# Patient Record
Sex: FEMALE | Race: WHITE | NOT HISPANIC OR LATINO | Employment: FULL TIME | URBAN - METROPOLITAN AREA
[De-identification: names, ages, dates, MRNs, and addresses within clinical notes are randomized per-mention and may not be internally consistent; named-entity substitution may affect disease eponyms.]

---

## 2023-05-19 LAB
ALANINE AMINOTRANSFERASE (SGPT) (U/L) IN SER/PLAS: 20 U/L (ref 7–45)
ALBUMIN (G/DL) IN SER/PLAS: 4 G/DL (ref 3.4–5)
ALKALINE PHOSPHATASE (U/L) IN SER/PLAS: 76 U/L (ref 33–136)
ANION GAP IN SER/PLAS: 12 MMOL/L (ref 10–20)
ASPARTATE AMINOTRANSFERASE (SGOT) (U/L) IN SER/PLAS: 17 U/L (ref 9–39)
BASOPHILS (10*3/UL) IN BLOOD BY AUTOMATED COUNT: 0.08 X10E9/L (ref 0–0.1)
BASOPHILS/100 LEUKOCYTES IN BLOOD BY AUTOMATED COUNT: 2 % (ref 0–2)
BILIRUBIN TOTAL (MG/DL) IN SER/PLAS: 1.1 MG/DL (ref 0–1.2)
CALCIDIOL (25 OH VITAMIN D3) (NG/ML) IN SER/PLAS: 43 NG/ML
CALCIUM (MG/DL) IN SER/PLAS: 9 MG/DL (ref 8.6–10.3)
CARBON DIOXIDE, TOTAL (MMOL/L) IN SER/PLAS: 25 MMOL/L (ref 21–32)
CHLORIDE (MMOL/L) IN SER/PLAS: 108 MMOL/L (ref 98–107)
COBALAMIN (VITAMIN B12) (PG/ML) IN SER/PLAS: 420 PG/ML (ref 211–911)
CREATININE (MG/DL) IN SER/PLAS: 0.68 MG/DL (ref 0.5–1.05)
EOSINOPHILS (10*3/UL) IN BLOOD BY AUTOMATED COUNT: 0.1 X10E9/L (ref 0–0.7)
EOSINOPHILS/100 LEUKOCYTES IN BLOOD BY AUTOMATED COUNT: 2.6 % (ref 0–6)
ERYTHROCYTE DISTRIBUTION WIDTH (RATIO) BY AUTOMATED COUNT: 13 % (ref 11.5–14.5)
ERYTHROCYTE MEAN CORPUSCULAR HEMOGLOBIN CONCENTRATION (G/DL) BY AUTOMATED: 33.3 G/DL (ref 32–36)
ERYTHROCYTE MEAN CORPUSCULAR VOLUME (FL) BY AUTOMATED COUNT: 87 FL (ref 80–100)
ERYTHROCYTES (10*6/UL) IN BLOOD BY AUTOMATED COUNT: 4.85 X10E12/L (ref 4–5.2)
FERRITIN (UG/LL) IN SER/PLAS: 181 UG/L (ref 8–150)
FOLATE (NG/ML) IN SER/PLAS: 15.9 NG/ML
GFR FEMALE: >90 ML/MIN/1.73M2
GLUCOSE (MG/DL) IN SER/PLAS: 151 MG/DL (ref 74–99)
HEMATOCRIT (%) IN BLOOD BY AUTOMATED COUNT: 42 % (ref 36–46)
HEMOGLOBIN (G/DL) IN BLOOD: 14 G/DL (ref 12–16)
IMMATURE GRANULOCYTES/100 LEUKOCYTES IN BLOOD BY AUTOMATED COUNT: 0 % (ref 0–0.9)
IRON (UG/DL) IN SER/PLAS: 93 UG/DL (ref 35–150)
IRON BINDING CAPACITY (UG/DL) IN SER/PLAS: 283 UG/DL (ref 240–445)
IRON SATURATION (%) IN SER/PLAS: 33 % (ref 25–45)
LEUKOCYTES (10*3/UL) IN BLOOD BY AUTOMATED COUNT: 3.9 X10E9/L (ref 4.4–11.3)
LYMPHOCYTES (10*3/UL) IN BLOOD BY AUTOMATED COUNT: 1.31 X10E9/L (ref 1.2–4.8)
LYMPHOCYTES/100 LEUKOCYTES IN BLOOD BY AUTOMATED COUNT: 33.4 % (ref 13–44)
MONOCYTES (10*3/UL) IN BLOOD BY AUTOMATED COUNT: 0.3 X10E9/L (ref 0.1–1)
MONOCYTES/100 LEUKOCYTES IN BLOOD BY AUTOMATED COUNT: 7.7 % (ref 2–10)
NEUTROPHILS (10*3/UL) IN BLOOD BY AUTOMATED COUNT: 2.13 X10E9/L (ref 1.2–7.7)
NEUTROPHILS/100 LEUKOCYTES IN BLOOD BY AUTOMATED COUNT: 54.3 % (ref 40–80)
NRBC (PER 100 WBCS) BY AUTOMATED COUNT: 0 /100 WBC (ref 0–0)
PARATHYRIN INTACT (PG/ML) IN SER/PLAS: 59.4 PG/ML (ref 18.5–88)
PLATELETS (10*3/UL) IN BLOOD AUTOMATED COUNT: 272 X10E9/L (ref 150–450)
POTASSIUM (MMOL/L) IN SER/PLAS: 3.5 MMOL/L (ref 3.5–5.3)
PROTEIN TOTAL: 6.3 G/DL (ref 6.4–8.2)
SODIUM (MMOL/L) IN SER/PLAS: 141 MMOL/L (ref 136–145)
UREA NITROGEN (MG/DL) IN SER/PLAS: 7 MG/DL (ref 6–23)

## 2023-05-24 LAB — VITAMIN B1, WHOLE BLOOD: 126 NMOL/L (ref 70–180)

## 2023-07-19 LAB
ALANINE AMINOTRANSFERASE (SGPT) (U/L) IN SER/PLAS: 12 U/L (ref 7–45)
ALBUMIN (G/DL) IN SER/PLAS: 4 G/DL (ref 3.4–5)
ALKALINE PHOSPHATASE (U/L) IN SER/PLAS: 74 U/L (ref 33–136)
ANION GAP IN SER/PLAS: 11 MMOL/L (ref 10–20)
ASPARTATE AMINOTRANSFERASE (SGOT) (U/L) IN SER/PLAS: 11 U/L (ref 9–39)
BASOPHILS (10*3/UL) IN BLOOD BY AUTOMATED COUNT: 0.06 X10E9/L (ref 0–0.1)
BASOPHILS/100 LEUKOCYTES IN BLOOD BY AUTOMATED COUNT: 1.6 % (ref 0–2)
BILIRUBIN TOTAL (MG/DL) IN SER/PLAS: 0.8 MG/DL (ref 0–1.2)
CALCIUM (MG/DL) IN SER/PLAS: 9 MG/DL (ref 8.6–10.3)
CARBON DIOXIDE, TOTAL (MMOL/L) IN SER/PLAS: 28 MMOL/L (ref 21–32)
CHLORIDE (MMOL/L) IN SER/PLAS: 107 MMOL/L (ref 98–107)
COBALAMIN (VITAMIN B12) (PG/ML) IN SER/PLAS: 189 PG/ML (ref 211–911)
CREATININE (MG/DL) IN SER/PLAS: 0.69 MG/DL (ref 0.5–1.05)
EOSINOPHILS (10*3/UL) IN BLOOD BY AUTOMATED COUNT: 0.11 X10E9/L (ref 0–0.7)
EOSINOPHILS/100 LEUKOCYTES IN BLOOD BY AUTOMATED COUNT: 3 % (ref 0–6)
ERYTHROCYTE DISTRIBUTION WIDTH (RATIO) BY AUTOMATED COUNT: 12.8 % (ref 11.5–14.5)
ERYTHROCYTE MEAN CORPUSCULAR HEMOGLOBIN CONCENTRATION (G/DL) BY AUTOMATED: 33.3 G/DL (ref 32–36)
ERYTHROCYTE MEAN CORPUSCULAR VOLUME (FL) BY AUTOMATED COUNT: 86 FL (ref 80–100)
ERYTHROCYTES (10*6/UL) IN BLOOD BY AUTOMATED COUNT: 4.91 X10E12/L (ref 4–5.2)
FERRITIN (UG/LL) IN SER/PLAS: 127 UG/L (ref 8–150)
GFR FEMALE: >90 ML/MIN/1.73M2
GLUCOSE (MG/DL) IN SER/PLAS: 133 MG/DL (ref 74–99)
HEMATOCRIT (%) IN BLOOD BY AUTOMATED COUNT: 42 % (ref 36–46)
HEMOGLOBIN (G/DL) IN BLOOD: 14 G/DL (ref 12–16)
IMMATURE GRANULOCYTES/100 LEUKOCYTES IN BLOOD BY AUTOMATED COUNT: 0.3 % (ref 0–0.9)
LEUKOCYTES (10*3/UL) IN BLOOD BY AUTOMATED COUNT: 3.7 X10E9/L (ref 4.4–11.3)
LYMPHOCYTES (10*3/UL) IN BLOOD BY AUTOMATED COUNT: 1.22 X10E9/L (ref 1.2–4.8)
LYMPHOCYTES/100 LEUKOCYTES IN BLOOD BY AUTOMATED COUNT: 32.8 % (ref 13–44)
MAGNESIUM (MG/DL) IN SER/PLAS: 1.88 MG/DL (ref 1.6–2.4)
MONOCYTES (10*3/UL) IN BLOOD BY AUTOMATED COUNT: 0.3 X10E9/L (ref 0.1–1)
MONOCYTES/100 LEUKOCYTES IN BLOOD BY AUTOMATED COUNT: 8.1 % (ref 2–10)
NEUTROPHILS (10*3/UL) IN BLOOD BY AUTOMATED COUNT: 2.02 X10E9/L (ref 1.2–7.7)
NEUTROPHILS/100 LEUKOCYTES IN BLOOD BY AUTOMATED COUNT: 54.2 % (ref 40–80)
NRBC (PER 100 WBCS) BY AUTOMATED COUNT: 0 /100 WBC (ref 0–0)
PLATELETS (10*3/UL) IN BLOOD AUTOMATED COUNT: 245 X10E9/L (ref 150–450)
POTASSIUM (MMOL/L) IN SER/PLAS: 3.6 MMOL/L (ref 3.5–5.3)
PROTEIN TOTAL: 6.3 G/DL (ref 6.4–8.2)
SODIUM (MMOL/L) IN SER/PLAS: 142 MMOL/L (ref 136–145)
UREA NITROGEN (MG/DL) IN SER/PLAS: 9 MG/DL (ref 6–23)

## 2023-07-24 LAB
VITAMIN A (RETINOL): 0.47 MG/L (ref 0.3–1.2)
VITAMIN A (RETINYL PALMITATE): <0.02 MG/L (ref 0–0.1)
VITAMIN A, INTERPRETATION: NORMAL

## 2023-08-30 ENCOUNTER — HOSPITAL ENCOUNTER (OUTPATIENT)
Dept: DATA CONVERSION | Facility: HOSPITAL | Age: 58
End: 2023-08-30
Attending: SURGERY | Admitting: SURGERY
Payer: COMMERCIAL

## 2023-08-30 DIAGNOSIS — R11.2 NAUSEA WITH VOMITING, UNSPECIFIED: ICD-10-CM

## 2023-08-30 DIAGNOSIS — Z98.84 BARIATRIC SURGERY STATUS: ICD-10-CM

## 2023-09-29 VITALS — HEIGHT: 68 IN | BODY MASS INDEX: 29.07 KG/M2 | WEIGHT: 191.8 LBS

## 2023-09-30 NOTE — H&P
History of Present Illness:   History Present Illness:  Reason for surgery: Dysphagia   HPI:    58F, BMI 29. Not tolerating solids. UGI: mild esophageal dysmotility, contrast passes.   EGD today with possible dilation    PMH: GERD, DM, KELLEE  PSH: RYGB (2/23) - MA, cataract, tubal ligation  FH: Mom: CHF, HTN, stroke, DM, HLD. Dad: MI.  SH: Non smoker, no alcohol, no drugs  Meds: Zofran, vit., dapagliflozin, citalopram, carvedilol, losartan, omeprazole 40, statin,  All: statin, Lisinopril, morphine    Review of Systems:  Constitutional: No subjective fever, No lethargy  Eyes: No vision change  Head/Neck: No head pain, no neck pain  Respiratory/Thorax: No difficulty breathing, no chest pain  Cardiovascular: No chest pain, no palpitations  Gastrointestinal: No nausea, +vomiting, no abdominal pain, no diarrhea  Genitourinary: No difficulty voiding  Musculoskeletal: No muscle pain, no joint pain  Neurological: No sensory deficit, no paresthesia   Psychological: No mood change  Skin: No rash, No itching        Allergies:        Allergies:  ·  lisinopril : Cough    Home Medication Review:   Home Medications Reviewed: yes     Impression/Procedure:   ·  Impression and Planned Procedure: 58F, BMI 29. Not tolerating solids. UGI: mild esophageal dysmotility, contrast passes.   EGD today with possible dilation       ERAS (Enhanced Recovery After Surgery):  ·  ERAS Patient: no                   Physical Exam by System:    Constitutional: Well developed, awake/alert/oriented  x3, no distress, alert and cooperative   Eyes: PERRL, EOMI, clear sclera   ENMT: mucous membranes moist, no apparent injury,  no lesions seen   Head/Neck: Neck supple, no apparent injury, thyroid  without mass or tenderness, No JVD   Respiratory/Thorax: Patent airways, CTAB, normal  breath sounds with good chest expansion, thorax symmetric   Cardiovascular: Regular, rate and rhythm, no murmurs,  warm extremities   Gastrointestinal: Nondistended, soft,  non-tender,  no rebound tenderness or guarding, no masses palpable, no organomegaly   Extremities: normal extremities, no cyanosis edema,  contusions or wounds, no clubbing   Neurological: alert and oriented x3, intact senses,  motor, response and reflexes, normal strength   Skin: Warm and dry, no lesions, no rashes     Consent:   COVID-19 Consent:  ·  COVID-19 Risk Consent Surgeon has reviewed key risks related to the risk of dario COVID-19 and if they contract COVID-19 what the risks are.       Electronic Signatures:  Yuliana Lau)  (Signed 30-Aug-2023 07:28)   Authored: [email or web address]   Co-Signer: History of Present Illness, Allergies, Home Medication Review, Impression/Procedure, ERAS, Physical Exam, Consent, Note Completion  Donita Phillip)  (Signed 29-Aug-2023 10:46)   Authored: History of Present Illness, Allergies, Home  Medication Review, Impression/Procedure, ERAS, Physical Exam, Consent, Note Completion      Last Updated: 30-Aug-2023 07:28 by Yuliana Lau)

## 2023-11-06 ENCOUNTER — NUTRITION (OUTPATIENT)
Dept: SURGERY | Facility: HOSPITAL | Age: 58
End: 2023-11-06
Payer: COMMERCIAL

## 2023-11-06 VITALS — WEIGHT: 175 LBS | BODY MASS INDEX: 26.68 KG/M2

## 2023-11-06 DIAGNOSIS — Z98.84 S/P GASTRIC BYPASS: Primary | ICD-10-CM

## 2023-11-06 NOTE — PROGRESS NOTES
Initial Surgery Date:  2.1.23  Surgeon:  uJdi  Procedure:  Gastric Bypass      ASSESSMENT:  Current weight:  175   lbs       Ht:   68       in.           BMI: 26.6  Previous weight:  186 lbs  Initial start weight:   269 lbs  EBW:   105 lbs  Total weight change: -94 lbs  %EBW Lost: 89.5%    PROGRESS:    Nutrition Interventions for last encounter (8.4.23)  1.  Try to drink 4-6 ounces of your smoothies every 2 hours. You can alternate smoothies with other high protein liquids like high protein pudding, yogurt, low fat cream soups with protein powder. Try to consume a total of 60-80 grams/day.  2.       Continue to drink 64 oz. of zero calorie beverages per day  3.       Continue no drinking 30 min before, during the meal and for 30 minutes after the meal  4.       Continue to follow vit/min regimen   5.       Stick with light activity like walking for now until you can consume more food and protein.  6.  Attend our Virtual Support Group monthly!    CHANGES IN TREATMENT:   Patient met goals:    Partially   Actions to meet previous goals:   Eat 3 meals and 2 snacks daily       24 hour food recall:  Soft/Regular, 60g protein, 64oz fluid   Breakfast:  1 oz crumbled sausage, 1 egg, peppers, 1 tbsp shredded cheese (10g)  Snack:   Lunch:  1c white chicken chili w/ cheese   Snack:     Dinner:  1c white chicken chili with cheese, x3 crackers   Snack:   Beverages:  64oz water  Alcohol:  none       Vitamins: x2 gummy MVI, 500 mg Calcium x2, B12 monthly IM  Medications: see list  Nausea/Vomiting/Diarrhea/Constipation: once/week   Physical Activity: walking twice/week at the [facility]     READINESS TO LEARN:  Motivation to learn:           Interested      Understanding of instruction: Good        Anticipated Compliance: Good    Family Support: Unable to assess-family not present   Patient presents with post-op weight loss surgery gastric bypass.  Pt is 9 months postop.  Recently had esophageal dilation 8/30/23 d/t dysphagia.   Self  reported weight today. Patient has lost 94 pounds since initial assessment accounting for 89.5% loss excess body weight.    Tolerating a Regular diet without difficulty.  Seems to do better with soft foods. Protein intake is not adequate for post-op individual. Reminded to eat 3 meals, and 1-2 high protein snacks to meet her goal. Encouraged to do liquid protein (yogurt, protein drink, cottage cheese, protein pudding) at mid-day snack. Fluid consumption is  adequate. Patient is not supplementing recommended vitamin/minerals. States she was told be her PCP to stop the Celebrate MVI and start taking gummies. May be when she had stricture. States she will be getting labs done this week and will follow up. Recommended to take calcium 3 tablets twice/day to get in 1200mg/day. Pt states no concerns/difficulties at this time.  Encouraged monthly SG meetings.        Malnutrition Screening  Significant unintentional weight loss? n/a  Eating less than 75% of usual intake for more than 2 weeks? n/a      Nutrition Diagnosis:   1. Increased protein and nutrition needs related to altered GI function as evidenced by pt. s/p gastric bypass   sleeve gastrectomy.   2. Food- and nutrition-related knowledge deficit related to lack of prior exposure to surgical weight loss information as evidenced by diet recall.       Nutrition Interventions:   1. Modify type and amount of food and nutrients within meals and snacks.  2. Comprehensive Nutrition Education  -Nutrition education materials: SG schedule, today's recap      Recommendations:    1. Eat 3 meals and 2 snacks/day to meet your protein goal. Aim for 3-4 ounces/meal, 1-2 ounce/snack. Your goal is 60-80g protein per day.  2. Continue to drink 64 oz. of zero calorie beverages per day  3. Continue no drinking 30 min before, during the meal and for 30 minutes after the meal  4. Complete your labs ordered by Dr. Barrett to see if we need to adjust your vitamins/minerals.   5. Continue to  exercise 2-3 days/week, working towards a goal of 30-45 minutes 5 days/week.  6.         Attend our Virtual Support Group Monthly!    Nutrition Monitoring and Evaluation:   Weight loss1-2 pounds per week  Criteria: weight check, food recall  Need for Follow-up: 1 year postop, JUSTYN WYNN  Bariatric Surgery Dietitian  Phone: [phone]

## 2023-11-26 ENCOUNTER — OFFICE VISIT (OUTPATIENT)
Dept: URGENT CARE | Facility: CLINIC | Age: 58
End: 2023-11-26
Payer: COMMERCIAL

## 2023-11-26 VITALS
BODY MASS INDEX: 26.52 KG/M2 | TEMPERATURE: 97.5 F | HEART RATE: 87 BPM | OXYGEN SATURATION: 94 % | SYSTOLIC BLOOD PRESSURE: 130 MMHG | WEIGHT: 175 LBS | HEIGHT: 68 IN | RESPIRATION RATE: 16 BRPM | DIASTOLIC BLOOD PRESSURE: 71 MMHG

## 2023-11-26 DIAGNOSIS — J01.00 ACUTE NON-RECURRENT MAXILLARY SINUSITIS: Primary | ICD-10-CM

## 2023-11-26 PROCEDURE — 3008F BODY MASS INDEX DOCD: CPT | Performed by: PHYSICIAN ASSISTANT

## 2023-11-26 PROCEDURE — 1036F TOBACCO NON-USER: CPT | Performed by: PHYSICIAN ASSISTANT

## 2023-11-26 PROCEDURE — 99204 OFFICE O/P NEW MOD 45 MIN: CPT | Performed by: PHYSICIAN ASSISTANT

## 2023-11-26 PROCEDURE — 87635 SARS-COV-2 COVID-19 AMP PRB: CPT

## 2023-11-26 RX ORDER — AMOXICILLIN AND CLAVULANATE POTASSIUM 875; 125 MG/1; MG/1
1 TABLET, FILM COATED ORAL 2 TIMES DAILY
Qty: 14 TABLET | Refills: 0 | Status: SHIPPED | OUTPATIENT
Start: 2023-11-26 | End: 2023-12-03

## 2023-11-26 ASSESSMENT — ENCOUNTER SYMPTOMS
PSYCHIATRIC NEGATIVE: 1
EYE DISCHARGE: 0
SORE THROAT: 0
WOUND: 0
COLOR CHANGE: 0
ALLERGIC/IMMUNOLOGIC NEGATIVE: 1
FATIGUE: 1
MYALGIAS: 1
ABDOMINAL PAIN: 0
COUGH: 1
BACK PAIN: 1
SHORTNESS OF BREATH: 0
HEMATOLOGIC/LYMPHATIC NEGATIVE: 1
RHINORRHEA: 0
ARTHRALGIAS: 0
NEUROLOGICAL NEGATIVE: 1
ACTIVITY CHANGE: 0
APPETITE CHANGE: 0
EYE PAIN: 0
FEVER: 0
SINUS PRESSURE: 1
VOMITING: 0
DIARRHEA: 0
NAUSEA: 0
SINUS PAIN: 1
BLOOD IN STOOL: 0
EYE REDNESS: 0
WHEEZING: 0
ENDOCRINE NEGATIVE: 1

## 2023-11-26 ASSESSMENT — PAIN SCALES - GENERAL: PAINLEVEL: 2

## 2023-11-26 NOTE — PATIENT INSTRUCTIONS
Assessment/Plan   Problem List Items Addressed This Visit       Acute non-recurrent maxillary sinusitis - Primary    Relevant Medications    amoxicillin-pot clavulanate (Augmentin) 875-125 mg tablet

## 2023-11-26 NOTE — PROGRESS NOTES
"Subjective   Patient ID: Ana Beltran is a 58 y.o. female who presents for URI (Cough, congestion, weakness x2 weeks/Neck & lower back pain today 11/25/23).  This patient notes that she has been having 2 weeks of upper respiratory symptoms also developed sinus pressure and pain over the maxillary sinuses over the last 5 days.  She notes that she has been trying over-the-counter medications.  She also notes that over the last 5 days she has developed some myalgias and feels like she is significantly fatigued.  She denies any chest pain or shortness of breath.  Denies any production to the cough that she has been having.  Denies any nausea vomiting diarrhea or abdominal pain      Review of Systems   Constitutional:  Positive for fatigue. Negative for activity change, appetite change and fever.   HENT:  Positive for congestion, sinus pressure and sinus pain. Negative for rhinorrhea and sore throat.    Eyes:  Negative for pain, discharge and redness.   Respiratory:  Positive for cough. Negative for shortness of breath and wheezing.    Cardiovascular:  Negative for chest pain and leg swelling.   Gastrointestinal:  Negative for abdominal pain, blood in stool, diarrhea, nausea and vomiting.   Endocrine: Negative.    Musculoskeletal:  Positive for back pain and myalgias. Negative for arthralgias and gait problem.   Skin:  Negative for color change, rash and wound.   Allergic/Immunologic: Negative.    Neurological: Negative.    Hematological: Negative.    Psychiatric/Behavioral: Negative.         Objective   /71   Pulse 87   Temp 36.4 °C (97.5 °F)   Resp 16   Ht 1.727 m (5' 8\")   Wt 79.4 kg (175 lb)   SpO2 94%   BMI 26.61 kg/m²   Physical Exam  Vitals reviewed.   Constitutional:       General: She is not in acute distress.     Appearance: Normal appearance. She is not toxic-appearing.   HENT:      Head: Normocephalic.      Comments: Exquisite tenderness to the maxillary sinuses bilaterally with palpation     " Right Ear: Tympanic membrane and ear canal normal. No tenderness. No mastoid tenderness.      Left Ear: Tympanic membrane and ear canal normal. No tenderness. No mastoid tenderness.      Nose: Congestion and rhinorrhea present.      Mouth/Throat:      Mouth: Mucous membranes are moist.      Pharynx: Oropharynx is clear. Posterior oropharyngeal erythema present.      Comments: There is no signs of peritonsillar abscess or retropharyngeal abscess  Eyes:      Conjunctiva/sclera: Conjunctivae normal.      Pupils: Pupils are equal, round, and reactive to light.   Neck:      Comments: Patient does have full range of motion of the neck though she does note that turning to the left increases pain along the left sided sternocleidomastoid and the left-sided trapezius.  Cardiovascular:      Rate and Rhythm: Normal rate and regular rhythm.      Heart sounds: No murmur heard.  Pulmonary:      Effort: No respiratory distress.      Breath sounds: No stridor. No wheezing, rhonchi or rales.   Chest:      Chest wall: No tenderness.   Abdominal:      Tenderness: There is no abdominal tenderness. There is no guarding.   Musculoskeletal:         General: Normal range of motion.      Cervical back: Normal range of motion. No rigidity or tenderness.      Comments: No CTL spinal tenderness on exam.  Some bilateral lower lumbar paraspinal muscular tenderness on exam   Lymphadenopathy:      Cervical: No cervical adenopathy.   Skin:     General: Skin is warm and dry.      Capillary Refill: Capillary refill takes less than 2 seconds.      Findings: No erythema.   Neurological:      General: No focal deficit present.      Mental Status: She is alert.         Assessment/Plan   Problem List Items Addressed This Visit       Acute non-recurrent maxillary sinusitis - Primary    Relevant Medications    amoxicillin-pot clavulanate (Augmentin) 875-125 mg tablet   -Patient with notable tenderness over the maxillary sinuses her symptoms have been ongoing  for 2 weeks now.  She also is endorsing some generalized fatigue, weakness, she also has some myalgia and muscular pain.  This has developed over the course of the last few days and I have sent a COVID PCR test to the lab to rule this out as a secondary issue.  Given the longevity of the patient's symptoms and physical exam findings I still opted to treat as acute bacterial rhinosinusitis

## 2023-11-27 LAB — SARS-COV-2 RNA RESP QL NAA+PROBE: NOT DETECTED

## 2024-01-29 ENCOUNTER — LAB (OUTPATIENT)
Dept: LAB | Facility: LAB | Age: 59
End: 2024-01-29
Payer: COMMERCIAL

## 2024-01-29 DIAGNOSIS — E78.5 HYPERLIPIDEMIA, UNSPECIFIED HYPERLIPIDEMIA TYPE: ICD-10-CM

## 2024-01-29 DIAGNOSIS — J01.90 ACUTE SINUSITIS, RECURRENCE NOT SPECIFIED, UNSPECIFIED LOCATION: ICD-10-CM

## 2024-01-29 DIAGNOSIS — E11.9 TYPE 2 DIABETES MELLITUS WITHOUT COMPLICATION, UNSPECIFIED WHETHER LONG TERM INSULIN USE (MULTI): ICD-10-CM

## 2024-01-29 LAB
ALBUMIN SERPL BCP-MCNC: 4.1 G/DL (ref 3.4–5)
ALP SERPL-CCNC: 96 U/L (ref 33–136)
ALT SERPL W P-5'-P-CCNC: 22 U/L (ref 7–45)
ANION GAP SERPL CALC-SCNC: 10 MMOL/L (ref 10–20)
AST SERPL W P-5'-P-CCNC: 14 U/L (ref 9–39)
BASOPHILS # BLD AUTO: 0.06 X10*3/UL (ref 0–0.1)
BASOPHILS NFR BLD AUTO: 1.4 %
BILIRUB SERPL-MCNC: 0.8 MG/DL (ref 0–1.2)
BUN SERPL-MCNC: 11 MG/DL (ref 6–23)
CALCIUM SERPL-MCNC: 9.3 MG/DL (ref 8.6–10.3)
CHLORIDE SERPL-SCNC: 105 MMOL/L (ref 98–107)
CHOLEST SERPL-MCNC: 175 MG/DL (ref 0–199)
CHOLESTEROL/HDL RATIO: 3.2
CO2 SERPL-SCNC: 30 MMOL/L (ref 21–32)
CREAT SERPL-MCNC: 0.65 MG/DL (ref 0.5–1.05)
EGFRCR SERPLBLD CKD-EPI 2021: >90 ML/MIN/1.73M*2
EOSINOPHIL # BLD AUTO: 0.08 X10*3/UL (ref 0–0.7)
EOSINOPHIL NFR BLD AUTO: 1.9 %
ERYTHROCYTE [DISTWIDTH] IN BLOOD BY AUTOMATED COUNT: 13 % (ref 11.5–14.5)
EST. AVERAGE GLUCOSE BLD GHB EST-MCNC: 166 MG/DL
GLUCOSE SERPL-MCNC: 98 MG/DL (ref 74–99)
HBA1C MFR BLD: 7.4 %
HCT VFR BLD AUTO: 40.6 % (ref 36–46)
HDLC SERPL-MCNC: 54 MG/DL
HGB BLD-MCNC: 13.3 G/DL (ref 12–16)
IMM GRANULOCYTES # BLD AUTO: 0.01 X10*3/UL (ref 0–0.7)
IMM GRANULOCYTES NFR BLD AUTO: 0.2 % (ref 0–0.9)
LDLC SERPL CALC-MCNC: 98 MG/DL
LYMPHOCYTES # BLD AUTO: 1.54 X10*3/UL (ref 1.2–4.8)
LYMPHOCYTES NFR BLD AUTO: 36.1 %
MCH RBC QN AUTO: 28.4 PG (ref 26–34)
MCHC RBC AUTO-ENTMCNC: 32.8 G/DL (ref 32–36)
MCV RBC AUTO: 87 FL (ref 80–100)
MONOCYTES # BLD AUTO: 0.3 X10*3/UL (ref 0.1–1)
MONOCYTES NFR BLD AUTO: 7 %
NEUTROPHILS # BLD AUTO: 2.28 X10*3/UL (ref 1.2–7.7)
NEUTROPHILS NFR BLD AUTO: 53.4 %
NON HDL CHOLESTEROL: 121 MG/DL (ref 0–149)
NRBC BLD-RTO: 0 /100 WBCS (ref 0–0)
PLATELET # BLD AUTO: 259 X10*3/UL (ref 150–450)
POTASSIUM SERPL-SCNC: 4 MMOL/L (ref 3.5–5.3)
PROT SERPL-MCNC: 6.3 G/DL (ref 6.4–8.2)
RBC # BLD AUTO: 4.69 X10*6/UL (ref 4–5.2)
SODIUM SERPL-SCNC: 141 MMOL/L (ref 136–145)
TRIGL SERPL-MCNC: 117 MG/DL (ref 0–149)
TSH SERPL-ACNC: 0.93 MIU/L (ref 0.44–3.98)
VLDL: 23 MG/DL (ref 0–40)
WBC # BLD AUTO: 4.3 X10*3/UL (ref 4.4–11.3)

## 2024-01-29 PROCEDURE — 84443 ASSAY THYROID STIM HORMONE: CPT

## 2024-01-29 PROCEDURE — 36415 COLL VENOUS BLD VENIPUNCTURE: CPT

## 2024-01-29 PROCEDURE — 80053 COMPREHEN METABOLIC PANEL: CPT

## 2024-01-29 PROCEDURE — 83036 HEMOGLOBIN GLYCOSYLATED A1C: CPT

## 2024-01-29 PROCEDURE — 85025 COMPLETE CBC W/AUTO DIFF WBC: CPT

## 2024-01-29 PROCEDURE — 80061 LIPID PANEL: CPT

## 2024-01-31 PROCEDURE — RXMED WILLOW AMBULATORY MEDICATION CHARGE

## 2024-02-01 PROCEDURE — RXMED WILLOW AMBULATORY MEDICATION CHARGE

## 2024-02-02 ENCOUNTER — TELEPHONE (OUTPATIENT)
Dept: PHARMACY | Facility: HOSPITAL | Age: 59
End: 2024-02-02
Payer: COMMERCIAL

## 2024-02-02 DIAGNOSIS — E11.9 CONTROLLED TYPE 2 DIABETES MELLITUS WITHOUT COMPLICATION, WITHOUT LONG-TERM CURRENT USE OF INSULIN (MULTI): Primary | ICD-10-CM

## 2024-02-02 NOTE — TELEPHONE ENCOUNTER
[facility] ([facility])    Ana Beltran is a 58 y.o. female was contacted by Clinical Pharmacy Team to complete a comprehensive medication review (CMR) with a pharmacist as part of the [employer] diabetes program.      No Known Allergies    [id number] - [place], [place] - [address]   [address] [place] [address]  Phone: [phone] Fax: [phone]    She has lost over 100 bs from bariatric surgery since last VBID visit. Since then has not had to take Tresiba or Novolog. Is on Farxiga 10 mg daily and metformin 1000 mg/day. She mentions she was losing too much weight. A1c is down to 7.4% from last check earlier in the week.     LAB  Lab Results   Component Value Date    BILITOT 0.8 01/29/2024    CALCIUM 9.3 01/29/2024    CO2 30 01/29/2024     01/29/2024    CREATININE 0.65 01/29/2024    GLUCOSE 98 01/29/2024    ALKPHOS 96 01/29/2024    K 4.0 01/29/2024    PROT 6.3 (L) 01/29/2024     01/29/2024    AST 14 01/29/2024    ALT 22 01/29/2024    BUN 11 01/29/2024    ANIONGAP 10 01/29/2024    MG 1.88 07/19/2023    ALBUMIN 4.1 01/29/2024    GFRF >90 07/19/2023     Lab Results   Component Value Date    TRIG 117 01/29/2024    CHOL 175 01/29/2024    LDLCALC 98 01/29/2024    HDL 54.0 01/29/2024     Lab Results   Component Value Date    HGBA1C 7.4 (H) 01/29/2024       Current Outpatient Medications on File Prior to Visit   Medication Sig Dispense Refill    blood sugar diagnostic (OneTouch Verio test strips) strip Check blood sugar twice daily 200 strip 1    blood sugar diagnostic strip Use to check blood sugar twice daily 200 each 1    blood sugar diagnostic strip use to test blood sugar 30 each 1    blood-glucose meter misc use to test blood sugar 1 each 0    carvedilol (Coreg) 3.125 mg tablet Take 1 tablet by mouth two times daily 180 tablet 0    cyanocobalamin, vitamin B-12, (Vitamin B-12) 2,500 mcg tablet, sublingual SL tablet take 1  tablet by mouth once daily 30 tablet 3    dapagliflozin propanediol (Farxiga) 10 mg TAKE 1 TABLET BY MOUTH ONCE DAILY 30 tablet 2    lancets 33 gauge misc Check blood sugar twice daily 200 each 1    losartan (Cozaar) 25 mg tablet TAKE 1 TABLET BY MOUTH ONCE DAILY 60 tablet 0    metFORMIN (Glucophage) 500 mg tablet Take 1 tablet by mouth twice daily 60 tablet 2    rosuvastatin (Crestor) 40 mg tablet Take 1 tablet (40 mg) by mouth once daily. 90 tablet 0     No current facility-administered medications on file prior to visit.        CURRENT DIABETES PHARMACOTHERAPY  - metformin 500 mg BID  - Farxiga 10 mg daily     SECONDARY PREVENTION  - Statin? yes  - ACE-I/ARB? yes    ASSESSMENT/PLAN:  - Patient enrolled in  Employee diabetes program for $0 co-pays on diabetes medications/supplies. Enrollment should be active in 2-4 weeks   - Requested VBID enrollment date: 2/2/24  - PharmD Management Level: 1  -  Pharmacy fill location: [place]    Problem List Items Addressed This Visit       Controlled type 2 diabetes mellitus without complication, without long-term current use of insulin (CMS/[facility]) - Primary    1. Continue taking metformin and farxiga as prescribed   2. Pharmacy follow up in 1 year or as needed      Continue all meds under the continuation of care with the referring provider and clinical pharmacy team.    Jg Anne, PharmD     Patient/Caregiver was informed they may decline to participate or withdraw from participation in pharmacy services at any time.

## 2024-02-06 ENCOUNTER — PHARMACY VISIT (OUTPATIENT)
Dept: PHARMACY | Facility: CLINIC | Age: 59
End: 2024-02-06
Payer: COMMERCIAL

## 2024-02-06 PROCEDURE — RXMED WILLOW AMBULATORY MEDICATION CHARGE

## 2024-02-29 PROCEDURE — RXMED WILLOW AMBULATORY MEDICATION CHARGE

## 2024-03-05 ENCOUNTER — PHARMACY VISIT (OUTPATIENT)
Dept: PHARMACY | Facility: CLINIC | Age: 59
End: 2024-03-05
Payer: COMMERCIAL

## 2024-03-05 PROCEDURE — RXMED WILLOW AMBULATORY MEDICATION CHARGE

## 2024-04-01 PROCEDURE — RXMED WILLOW AMBULATORY MEDICATION CHARGE

## 2024-04-03 ENCOUNTER — PHARMACY VISIT (OUTPATIENT)
Dept: PHARMACY | Facility: CLINIC | Age: 59
End: 2024-04-03
Payer: COMMERCIAL

## 2024-04-03 PROCEDURE — RXMED WILLOW AMBULATORY MEDICATION CHARGE

## 2024-04-04 ENCOUNTER — PHARMACY VISIT (OUTPATIENT)
Dept: PHARMACY | Facility: CLINIC | Age: 59
End: 2024-04-04
Payer: COMMERCIAL

## 2024-04-04 PROCEDURE — RXMED WILLOW AMBULATORY MEDICATION CHARGE

## 2024-04-06 ENCOUNTER — APPOINTMENT (OUTPATIENT)
Dept: RADIOLOGY | Facility: CLINIC | Age: 59
End: 2024-04-06
Payer: COMMERCIAL

## 2024-04-06 ENCOUNTER — HOSPITAL ENCOUNTER (OUTPATIENT)
Dept: RADIOLOGY | Facility: CLINIC | Age: 59
Discharge: HOME | End: 2024-04-06
Payer: COMMERCIAL

## 2024-04-06 DIAGNOSIS — Z12.31 ENCOUNTER FOR SCREENING MAMMOGRAM FOR MALIGNANT NEOPLASM OF BREAST: ICD-10-CM

## 2024-04-06 PROCEDURE — 77067 SCR MAMMO BI INCL CAD: CPT | Performed by: RADIOLOGY

## 2024-04-06 PROCEDURE — 77067 SCR MAMMO BI INCL CAD: CPT

## 2024-04-06 PROCEDURE — 77063 BREAST TOMOSYNTHESIS BI: CPT | Performed by: RADIOLOGY

## 2024-04-09 ENCOUNTER — PHARMACY VISIT (OUTPATIENT)
Dept: PHARMACY | Facility: CLINIC | Age: 59
End: 2024-04-09
Payer: COMMERCIAL

## 2024-04-15 PROCEDURE — RXMED WILLOW AMBULATORY MEDICATION CHARGE

## 2024-04-18 ENCOUNTER — PHARMACY VISIT (OUTPATIENT)
Dept: PHARMACY | Facility: CLINIC | Age: 59
End: 2024-04-18
Payer: COMMERCIAL

## 2024-05-01 ENCOUNTER — LAB (OUTPATIENT)
Dept: LAB | Facility: LAB | Age: 59
End: 2024-05-01
Payer: COMMERCIAL

## 2024-05-01 DIAGNOSIS — E11.9 CONTROLLED TYPE 2 DIABETES MELLITUS WITHOUT COMPLICATION, WITHOUT LONG-TERM CURRENT USE OF INSULIN (MULTI): ICD-10-CM

## 2024-05-01 DIAGNOSIS — Z98.84 S/P GASTRIC BYPASS: ICD-10-CM

## 2024-05-01 DIAGNOSIS — E11.65 TYPE 2 DIABETES MELLITUS WITH HYPERGLYCEMIA (MULTI): Primary | ICD-10-CM

## 2024-05-01 DIAGNOSIS — I10 HYPERTENSION, UNSPECIFIED TYPE: ICD-10-CM

## 2024-05-01 LAB
ALBUMIN SERPL BCP-MCNC: 4.2 G/DL (ref 3.4–5)
ALP SERPL-CCNC: 73 U/L (ref 33–136)
ALT SERPL W P-5'-P-CCNC: 20 U/L (ref 7–45)
ANION GAP SERPL CALC-SCNC: 10 MMOL/L (ref 10–20)
AST SERPL W P-5'-P-CCNC: 15 U/L (ref 9–39)
BASOPHILS # BLD AUTO: 0.05 X10*3/UL (ref 0–0.1)
BASOPHILS NFR BLD AUTO: 1.1 %
BILIRUB SERPL-MCNC: 0.5 MG/DL (ref 0–1.2)
BUN SERPL-MCNC: 14 MG/DL (ref 6–23)
CALCIUM SERPL-MCNC: 8.8 MG/DL (ref 8.6–10.3)
CHLORIDE SERPL-SCNC: 107 MMOL/L (ref 98–107)
CHOLEST SERPL-MCNC: 106 MG/DL (ref 0–199)
CHOLESTEROL/HDL RATIO: 2.2
CO2 SERPL-SCNC: 28 MMOL/L (ref 21–32)
CREAT SERPL-MCNC: 0.28 MG/DL (ref 0.5–1.05)
EGFRCR SERPLBLD CKD-EPI 2021: >90 ML/MIN/1.73M*2
EOSINOPHIL # BLD AUTO: 0.1 X10*3/UL (ref 0–0.7)
EOSINOPHIL NFR BLD AUTO: 2.3 %
ERYTHROCYTE [DISTWIDTH] IN BLOOD BY AUTOMATED COUNT: 12.4 % (ref 11.5–14.5)
GLUCOSE SERPL-MCNC: 139 MG/DL (ref 74–99)
HCT VFR BLD AUTO: 36.9 % (ref 36–46)
HDLC SERPL-MCNC: 47.6 MG/DL
HGB BLD-MCNC: 12.6 G/DL (ref 12–16)
IMM GRANULOCYTES # BLD AUTO: 0 X10*3/UL (ref 0–0.7)
IMM GRANULOCYTES NFR BLD AUTO: 0 % (ref 0–0.9)
LDLC SERPL CALC-MCNC: 43 MG/DL
LYMPHOCYTES # BLD AUTO: 1.64 X10*3/UL (ref 1.2–4.8)
LYMPHOCYTES NFR BLD AUTO: 37 %
MCH RBC QN AUTO: 29.2 PG (ref 26–34)
MCHC RBC AUTO-ENTMCNC: 34.1 G/DL (ref 32–36)
MCV RBC AUTO: 86 FL (ref 80–100)
MONOCYTES # BLD AUTO: 0.26 X10*3/UL (ref 0.1–1)
MONOCYTES NFR BLD AUTO: 5.9 %
NEUTROPHILS # BLD AUTO: 2.38 X10*3/UL (ref 1.2–7.7)
NEUTROPHILS NFR BLD AUTO: 53.7 %
NON HDL CHOLESTEROL: 58 MG/DL (ref 0–149)
NRBC BLD-RTO: 0 /100 WBCS (ref 0–0)
PLATELET # BLD AUTO: 243 X10*3/UL (ref 150–450)
POTASSIUM SERPL-SCNC: 4.1 MMOL/L (ref 3.5–5.3)
PROT SERPL-MCNC: 6.2 G/DL (ref 6.4–8.2)
RBC # BLD AUTO: 4.31 X10*6/UL (ref 4–5.2)
SODIUM SERPL-SCNC: 141 MMOL/L (ref 136–145)
TRIGL SERPL-MCNC: 79 MG/DL (ref 0–149)
TSH SERPL-ACNC: 0.93 MIU/L (ref 0.44–3.98)
VIT B12 SERPL-MCNC: 1167 PG/ML (ref 211–911)
VLDL: 16 MG/DL (ref 0–40)
WBC # BLD AUTO: 4.4 X10*3/UL (ref 4.4–11.3)

## 2024-05-01 PROCEDURE — 84443 ASSAY THYROID STIM HORMONE: CPT

## 2024-05-01 PROCEDURE — 85025 COMPLETE CBC W/AUTO DIFF WBC: CPT

## 2024-05-01 PROCEDURE — 82607 VITAMIN B-12: CPT

## 2024-05-01 PROCEDURE — 36415 COLL VENOUS BLD VENIPUNCTURE: CPT

## 2024-05-01 PROCEDURE — 80061 LIPID PANEL: CPT

## 2024-05-01 PROCEDURE — 84207 ASSAY OF VITAMIN B-6: CPT

## 2024-05-01 PROCEDURE — 83036 HEMOGLOBIN GLYCOSYLATED A1C: CPT

## 2024-05-01 PROCEDURE — 80053 COMPREHEN METABOLIC PANEL: CPT

## 2024-05-02 LAB
EST. AVERAGE GLUCOSE BLD GHB EST-MCNC: 140 MG/DL
HBA1C MFR BLD: 6.5 %

## 2024-05-02 PROCEDURE — RXMED WILLOW AMBULATORY MEDICATION CHARGE

## 2024-05-05 LAB — PYRIDOXAL PHOS SERPL-SCNC: 17.5 NMOL/L (ref 20–125)

## 2024-05-06 PROCEDURE — RXMED WILLOW AMBULATORY MEDICATION CHARGE

## 2024-05-08 ENCOUNTER — PHARMACY VISIT (OUTPATIENT)
Dept: PHARMACY | Facility: CLINIC | Age: 59
End: 2024-05-08
Payer: COMMERCIAL

## 2024-06-06 PROCEDURE — RXMED WILLOW AMBULATORY MEDICATION CHARGE

## 2024-06-17 ENCOUNTER — PHARMACY VISIT (OUTPATIENT)
Dept: PHARMACY | Facility: CLINIC | Age: 59
End: 2024-06-17
Payer: COMMERCIAL

## 2024-07-29 PROCEDURE — RXMED WILLOW AMBULATORY MEDICATION CHARGE

## 2024-08-01 ENCOUNTER — PHARMACY VISIT (OUTPATIENT)
Dept: PHARMACY | Facility: CLINIC | Age: 59
End: 2024-08-01
Payer: COMMERCIAL

## 2024-09-09 PROCEDURE — RXMED WILLOW AMBULATORY MEDICATION CHARGE

## 2024-09-10 ENCOUNTER — LAB (OUTPATIENT)
Dept: LAB | Facility: LAB | Age: 59
End: 2024-09-10
Payer: COMMERCIAL

## 2024-09-10 DIAGNOSIS — Z98.84 S/P GASTRIC BYPASS: ICD-10-CM

## 2024-09-10 DIAGNOSIS — E55.9 VITAMIN D INSUFFICIENCY: ICD-10-CM

## 2024-09-10 DIAGNOSIS — I10 HYPERTENSION, UNSPECIFIED TYPE: ICD-10-CM

## 2024-09-10 DIAGNOSIS — E78.5 HYPERLIPIDEMIA, UNSPECIFIED HYPERLIPIDEMIA TYPE: ICD-10-CM

## 2024-09-10 DIAGNOSIS — E53.9 VITAMIN B DEFICIENCY: ICD-10-CM

## 2024-09-10 DIAGNOSIS — E11.9 TYPE 2 DIABETES MELLITUS WITHOUT COMPLICATION, WITHOUT LONG-TERM CURRENT USE OF INSULIN (MULTI): ICD-10-CM

## 2024-09-10 LAB
25(OH)D3 SERPL-MCNC: 28 NG/ML (ref 30–100)
ALBUMIN SERPL BCP-MCNC: 4.1 G/DL (ref 3.4–5)
ALP SERPL-CCNC: 90 U/L (ref 33–136)
ALT SERPL W P-5'-P-CCNC: 15 U/L (ref 7–45)
ANION GAP SERPL CALC-SCNC: 10 MMOL/L (ref 10–20)
AST SERPL W P-5'-P-CCNC: 14 U/L (ref 9–39)
BASOPHILS # BLD AUTO: 0.05 X10*3/UL (ref 0–0.1)
BASOPHILS NFR BLD AUTO: 1.1 %
BILIRUB SERPL-MCNC: 0.4 MG/DL (ref 0–1.2)
BUN SERPL-MCNC: 10 MG/DL (ref 6–23)
CALCIUM SERPL-MCNC: 9 MG/DL (ref 8.6–10.3)
CHLORIDE SERPL-SCNC: 105 MMOL/L (ref 98–107)
CHOLEST SERPL-MCNC: 173 MG/DL (ref 0–199)
CHOLESTEROL/HDL RATIO: 3.3
CO2 SERPL-SCNC: 29 MMOL/L (ref 21–32)
CREAT SERPL-MCNC: 0.66 MG/DL (ref 0.5–1.05)
EGFRCR SERPLBLD CKD-EPI 2021: >90 ML/MIN/1.73M*2
EOSINOPHIL # BLD AUTO: 0.08 X10*3/UL (ref 0–0.7)
EOSINOPHIL NFR BLD AUTO: 1.8 %
ERYTHROCYTE [DISTWIDTH] IN BLOOD BY AUTOMATED COUNT: 12.3 % (ref 11.5–14.5)
GLUCOSE SERPL-MCNC: 138 MG/DL (ref 74–99)
HCT VFR BLD AUTO: 39.6 % (ref 36–46)
HDLC SERPL-MCNC: 52.1 MG/DL
HGB BLD-MCNC: 13.7 G/DL (ref 12–16)
IMM GRANULOCYTES # BLD AUTO: 0.01 X10*3/UL (ref 0–0.7)
IMM GRANULOCYTES NFR BLD AUTO: 0.2 % (ref 0–0.9)
LDLC SERPL CALC-MCNC: 96 MG/DL
LYMPHOCYTES # BLD AUTO: 1.48 X10*3/UL (ref 1.2–4.8)
LYMPHOCYTES NFR BLD AUTO: 32.9 %
MAGNESIUM SERPL-MCNC: 1.89 MG/DL (ref 1.6–2.4)
MCH RBC QN AUTO: 30.2 PG (ref 26–34)
MCHC RBC AUTO-ENTMCNC: 34.6 G/DL (ref 32–36)
MCV RBC AUTO: 87 FL (ref 80–100)
MONOCYTES # BLD AUTO: 0.27 X10*3/UL (ref 0.1–1)
MONOCYTES NFR BLD AUTO: 6 %
NEUTROPHILS # BLD AUTO: 2.61 X10*3/UL (ref 1.2–7.7)
NEUTROPHILS NFR BLD AUTO: 58 %
NON HDL CHOLESTEROL: 121 MG/DL (ref 0–149)
NRBC BLD-RTO: 0 /100 WBCS (ref 0–0)
PHOSPHATE SERPL-MCNC: 3.9 MG/DL (ref 2.5–4.9)
PLATELET # BLD AUTO: 262 X10*3/UL (ref 150–450)
POTASSIUM SERPL-SCNC: 4.3 MMOL/L (ref 3.5–5.3)
PROT SERPL-MCNC: 6.2 G/DL (ref 6.4–8.2)
RBC # BLD AUTO: 4.54 X10*6/UL (ref 4–5.2)
SODIUM SERPL-SCNC: 140 MMOL/L (ref 136–145)
TRIGL SERPL-MCNC: 124 MG/DL (ref 0–149)
TSH SERPL-ACNC: 1.02 MIU/L (ref 0.44–3.98)
VIT B12 SERPL-MCNC: 340 PG/ML (ref 211–911)
VLDL: 25 MG/DL (ref 0–40)
WBC # BLD AUTO: 4.5 X10*3/UL (ref 4.4–11.3)

## 2024-09-10 PROCEDURE — 84443 ASSAY THYROID STIM HORMONE: CPT

## 2024-09-10 PROCEDURE — 82306 VITAMIN D 25 HYDROXY: CPT

## 2024-09-10 PROCEDURE — 80061 LIPID PANEL: CPT

## 2024-09-10 PROCEDURE — 83036 HEMOGLOBIN GLYCOSYLATED A1C: CPT

## 2024-09-10 PROCEDURE — 85025 COMPLETE CBC W/AUTO DIFF WBC: CPT

## 2024-09-10 PROCEDURE — 36415 COLL VENOUS BLD VENIPUNCTURE: CPT

## 2024-09-10 PROCEDURE — 83735 ASSAY OF MAGNESIUM: CPT

## 2024-09-10 PROCEDURE — 84100 ASSAY OF PHOSPHORUS: CPT

## 2024-09-10 PROCEDURE — 82607 VITAMIN B-12: CPT

## 2024-09-10 PROCEDURE — 80053 COMPREHEN METABOLIC PANEL: CPT

## 2024-09-11 LAB
EST. AVERAGE GLUCOSE BLD GHB EST-MCNC: 123 MG/DL
HBA1C MFR BLD: 5.9 %

## 2024-09-12 ENCOUNTER — PHARMACY VISIT (OUTPATIENT)
Dept: PHARMACY | Facility: CLINIC | Age: 59
End: 2024-09-12
Payer: COMMERCIAL

## 2024-09-12 PROCEDURE — RXMED WILLOW AMBULATORY MEDICATION CHARGE

## 2024-09-12 RX ORDER — RESPIRATORY SYNCYTIAL VISUS VACCINE RECOMBINANT, ADJUVANTED 120MCG/0.5
KIT INTRAMUSCULAR
Qty: 0.5 ML | Refills: 0 | OUTPATIENT
Start: 2024-09-12

## 2024-09-12 RX ORDER — ZOSTER VACCINE RECOMBINANT, ADJUVANTED 50 MCG/0.5
KIT INTRAMUSCULAR
Qty: 0.5 EACH | Refills: 1 | OUTPATIENT
Start: 2024-09-12

## 2024-09-12 RX ORDER — PITAVASTATIN CALCIUM 2.09 MG/1
TABLET, FILM COATED ORAL
Qty: 90 TABLET | Refills: 0 | OUTPATIENT
Start: 2024-09-12

## 2024-09-12 RX ORDER — ACETAMINOPHEN AND PHENYLEPHRINE HCL 325; 5 MG/1; MG/1
5000 TABLET ORAL DAILY
Qty: 90 TABLET | Refills: 0 | OUTPATIENT
Start: 2024-09-12

## 2024-09-12 RX ORDER — CITALOPRAM 20 MG/1
TABLET, FILM COATED ORAL
Qty: 90 TABLET | Refills: 1 | OUTPATIENT
Start: 2024-09-12

## 2024-09-16 ENCOUNTER — PHARMACY VISIT (OUTPATIENT)
Dept: PHARMACY | Facility: CLINIC | Age: 59
End: 2024-09-16
Payer: COMMERCIAL

## 2024-10-08 PROCEDURE — RXMED WILLOW AMBULATORY MEDICATION CHARGE

## 2024-10-09 ENCOUNTER — PHARMACY VISIT (OUTPATIENT)
Dept: PHARMACY | Facility: CLINIC | Age: 59
End: 2024-10-09
Payer: COMMERCIAL

## 2024-11-11 ENCOUNTER — TELEPHONE (OUTPATIENT)
Dept: SURGERY | Facility: CLINIC | Age: 59
End: 2024-11-11
Payer: COMMERCIAL

## 2024-11-11 DIAGNOSIS — Z98.84 S/P GASTRIC BYPASS: ICD-10-CM

## 2024-11-11 DIAGNOSIS — R11.2 NAUSEA AND VOMITING, UNSPECIFIED VOMITING TYPE: ICD-10-CM

## 2024-11-11 RX ORDER — ONDANSETRON 4 MG/1
4 TABLET, ORALLY DISINTEGRATING ORAL EVERY 8 HOURS PRN
Qty: 60 TABLET | Refills: 0 | Status: SHIPPED | OUTPATIENT
Start: 2024-11-11 | End: 2024-12-11

## 2024-11-12 PROBLEM — G47.33 OBSTRUCTIVE SLEEP APNEA OF ADULT: Status: ACTIVE | Noted: 2024-11-12

## 2024-11-12 PROBLEM — N32.81 OVERACTIVE BLADDER: Status: ACTIVE | Noted: 2024-11-12

## 2024-11-12 PROBLEM — R05.9 COUGH: Status: ACTIVE | Noted: 2024-10-08

## 2024-11-12 PROBLEM — F32.5 DEPRESSION, MAJOR, IN REMISSION (CMS-HCC): Status: ACTIVE | Noted: 2024-11-12

## 2024-11-12 PROBLEM — Z98.84 BARIATRIC SURGERY STATUS: Status: ACTIVE | Noted: 2023-07-19

## 2024-11-12 PROBLEM — R11.2 NAUSEA AND VOMITING: Status: ACTIVE | Noted: 2024-11-12

## 2024-11-12 PROBLEM — I10 HTN (HYPERTENSION): Status: ACTIVE | Noted: 2019-03-01

## 2024-11-12 PROBLEM — B34.9 ACUTE VIRAL SYNDROME: Status: ACTIVE | Noted: 2024-11-12

## 2024-11-12 PROBLEM — K21.9 GASTROESOPHAGEAL REFLUX DISEASE: Status: ACTIVE | Noted: 2023-07-19

## 2024-11-12 PROBLEM — R10.9 ABDOMINAL PAIN: Status: ACTIVE | Noted: 2024-11-12

## 2024-11-12 PROBLEM — E66.9 OBESITY WITH BODY MASS INDEX 30 OR GREATER: Status: ACTIVE | Noted: 2024-11-12

## 2024-11-12 PROBLEM — E78.5 HYPERLIPIDEMIA: Status: ACTIVE | Noted: 2019-03-01

## 2024-11-12 PROBLEM — K91.2 POSTOPERATIVE MALABSORPTION (HHS-HCC): Status: ACTIVE | Noted: 2024-11-12

## 2024-11-12 PROBLEM — J18.9 LEFT LOWER LOBE PNEUMONIA: Status: ACTIVE | Noted: 2024-11-12

## 2024-11-12 PROBLEM — Z98.84 S/P GASTRIC BYPASS: Status: ACTIVE | Noted: 2024-05-02

## 2024-11-12 PROBLEM — Z98.890 POSTOPERATIVE NAUSEA: Status: ACTIVE | Noted: 2023-07-24

## 2024-11-12 PROBLEM — E55.9 VITAMIN D DEFICIENCY: Status: ACTIVE | Noted: 2019-04-30

## 2024-11-12 PROBLEM — R13.10 DYSPHAGIA: Status: ACTIVE | Noted: 2023-07-19

## 2024-11-12 PROBLEM — R11.0 POSTOPERATIVE NAUSEA: Status: ACTIVE | Noted: 2023-07-24

## 2024-11-12 PROBLEM — K91.30 GASTROINTESTINAL ANASTOMOTIC STRICTURE: Status: ACTIVE | Noted: 2024-11-12

## 2024-11-12 PROBLEM — R06.02 SOB (SHORTNESS OF BREATH) ON EXERTION: Status: ACTIVE | Noted: 2024-11-12

## 2024-11-12 PROBLEM — G47.00 INSOMNIA: Status: ACTIVE | Noted: 2024-11-12

## 2024-11-12 PROBLEM — R53.83 FATIGUE: Status: ACTIVE | Noted: 2024-11-12

## 2024-11-12 PROBLEM — L03.211 CELLULITIS OF FACE: Status: ACTIVE | Noted: 2024-11-12

## 2024-11-12 NOTE — PROGRESS NOTES
BARIATRIC SURGERY CLINIC  FOLLOW UP NOTE      Name: Ana Beltran  MRN: [id number]      Index Surgery  Date of Surgery: 2/1/2023   Surgeon: Dr. Yuliana Lau    Surgical Procedure: Laparoscopic mariah en y gastric bypass 30115    Virtual or Telephone Consent    An interactive audio and video telecommunication system which permits real time communications between the patient (at the originating site) and provider (at the distant site) was utilized to provide this telehealth service.   Verbal consent was requested and obtained from Ana Beltran on this date, 11/14/24 for a telehealth visit.       HPI: 59 year old female presenting today with concerns related to nausea and vomiting after ingestion of solid proteins for about the last two weeks.  Patient denies any sick contacts.  Endorses diarrhea that started within the last couple of days. Patient had an upper GI Xray completed yesterday.     Diet Regular (unable to tolerate solid protein presently)     Concerns related to:  Nausea/Vomiting, Reflux: Postprandial vomiting of solid proteins   Abdominal Pain: Denies  Diarrhea/Constipation Diarrhea     DAILY SUPPLEMENTS:  Calcium: Calcium Citrate w/ vitamin D (1200 - 1500mg)  Multivitamin & Minerals: 2 per day  Vitamin B12: 500 mcg/day     PPI:Omeprazole 40mg daily       Upper GI 11/13/24  FINDINGS:  Initial  image demonstrates  an unremarkable bowel gas pattern.      Distal esophagus: Grossly unremarkable with limited visualization at  fluoroscopy      GE junction:  Unremarkable      Gastric pouch: Gastrografin readily passed the GE junction filling  the gastric pouch without evidence of obstruction or extravasation.      Mucosa:  Unremarkable without definite ulceration      Mariah limb: Gastrografin passes without obstruction or extravasation.      Proximal small bowel: Unremarkable without dilatation or mucosal  edema. The jejunal-jejunal anastomosis was not distinctly visualized.      IMPRESSION:  1.  Unremarkable upper GI study status post remote Mariah-en-Y  reconstruction.    Current Outpatient Medications   Medication Sig Dispense Refill    albuterol 90 mcg/actuation inhaler Inhale 2 puffs by mouth every 6 hours as needed for wheezing 18 g 0    benzonatate (Tessalon Perles) 100 mg capsule Take 1 capsule by mouth 3 times daily for 7 days 21 capsule 0    blood sugar diagnostic (OneTouch Verio test strips) strip Check blood sugar twice daily 200 strip 1    blood sugar diagnostic strip Use to check blood sugar twice daily 200 each 1    blood sugar diagnostic strip use to test blood sugar 30 each 1    blood-glucose meter misc use to test blood sugar 1 each 0    carvedilol (Coreg) 3.125 mg tablet Take 1 tablet by mouth two times daily 180 tablet 0    cholecalciferol, vitamin D3, 125 mcg (5,000 unit) tablet,disintegrating Take 5,000 Units by mouth once daily. 90 tablet 0    citalopram (CeleXA) 20 mg tablet take 1 tablet by mouth daily 90 tablet 1    cyanocobalamin (Vitamin B-12) 250 mcg tablet Take 1 tablet by mouth daily 90 tablet 0    cyanocobalamin, vitamin B-12, (Vitamin B-12) 2,500 mcg tablet, sublingual SL tablet take 1 tablet by mouth once daily 30 tablet 3    dapagliflozin propanediol (Farxiga) 10 mg TAKE 1 TABLET BY MOUTH ONCE DAILY 30 tablet 2    dexAMETHasone (Decadron) 6 mg tablet Take 1 tablet ORALLY DAILY for 7 days 7 tablet 0    folic acid/vit B complex and C (B complex-vitamin C-folic acid) 400 mcg tablet extended release Take 1 tablet by mouth once daily. 90 tablet 1    lancets 33 gauge misc Check blood sugar twice daily 200 each 1    losartan (Cozaar) 25 mg tablet TAKE 1 TABLET BY MOUTH ONCE DAILY 60 tablet 0    losartan (Cozaar) 25 mg tablet Take 1 tablet by mouth everyday 90 tablet 1    metFORMIN (Glucophage) 500 mg tablet Take 1 tablet by mouth twice daily 60 tablet 2    ondansetron ODT (Zofran-ODT) 4 mg disintegrating tablet Take 1 tablet (4 mg) by mouth every 8 hours if needed for nausea or  vomiting. 60 tablet 0    pitavastatin calcium 2 mg tablet TAKE 1 TABLET BY MOUTH DAILY 90 tablet 0    respiratory syncytial virus, RSV, vaccine, adjuvanted, age 60y+ (Arexvy, PF,) 120 mcg/0.5 mL suspension for reconstitution INJECT 0.5 mL IM ONCE 0.5 mL 0    rosuvastatin (Crestor) 40 mg tablet Take 1 tablet by mouth daily 90 tablet 1    zoster vaccine-recombinant adjuvanted (Shingrix, PF,) 50 mcg/0.5 mL vaccine INJECT 0.5 mL ONCE,Instr:repeat dose in 2 to 6 months 0.5 each 1     No current facility-administered medications for this visit.       Comorbidities:  Patient Active Problem List   Diagnosis    Acute non-recurrent maxillary sinusitis    Controlled type 2 diabetes mellitus without complication, without long-term current use of insulin (Multi)    Bariatric surgery status    Abdominal pain    Acute viral syndrome    Cellulitis of face    Cough    Depression, major, in remission (CMS-[facility])    Dysphagia    HTN (hypertension)    Fatigue    Gastroesophageal reflux disease    Gastrointestinal anastomotic stricture    Hyperlipidemia    Insomnia    Left lower lobe pneumonia    Postoperative nausea    Obesity with body mass index 30 or greater    Obstructive sleep apnea of adult    Overactive bladder    S/P gastric bypass    SOB (shortness of breath) on exertion    Sprain of ankle    Vitamin D deficiency    Nausea and vomiting    Postoperative malabsorption ([facility])         REVIEW OF SYSTEMS:  CONSTITUTIONAL: Patient denies fevers, chills, sweats and weight changes.  CARDIOVASCULAR: Patient denies chest pains, palpitations, orthopnea and paroxysmal nocturnal dyspnea.  RESPIRATORY: No dyspnea on exertion, no wheezing or cough.  GI: No nausea, vomiting, diarrhea, constipation, abdominal pain, hematochezia or melena.  MUSCULOSKELETAL: No myalgias or arthralgias.  NEUROLOGIC: No chronic headaches, no seizures. Patient denies numbness, tingling or weakness.  PSYCHIATRIC: Patient denies problems with mood disturbance. No  problems with anxiety.  ENDOCRINE: No excessive urination or excessive thirst.  DERMATOLOGIC: Patient denies any rashes or skin changes.    PHYSICAL EXAM:  There were no vitals taken for this visit.  Alert, well appearing, no acute distress, nourished, hydrated.  Anicteric sclera, no ptosis  Facial symmetry  Neck supple  Unlabored respirations.  Easily conversant without increased respiratory effort  Oriented to person, place, time.  Judgement intact.  Appropriate mood, affect.       ASSESSMENT & PLAN:  59 y.o. female presenting for follow up visit s/p xiomara-en-y gastric bypass in 2023 with concerns related to nausea and vomiting after ingestion of solid proteins for about the last two weeks.  Patient denies any sick contacts.  Endorses diarrhea that started within the last couple of days. Patient had an upper GI Xray completed yesterday.     Upper GI was reportedly normal I do not see any significant stricture or any other problems.    Etiology of her symptoms is unclear.  She was recently placed on Decadron for upper respiratory tract issues and was not on PPI during that time.  She could have developed an ulcer versus other issues which need further workup.      Plan:    Will start omeprazole 40 mg daily  Will get a CT scan abdomen pelvis with p.o. and IV contrast  Continue bariatric diet, supplements      Follow up after CT scan is done.

## 2024-11-12 NOTE — PATIENT INSTRUCTIONS
You were seen today for concerns with nausea and vomiting.   You will be due for annual lab work, please have this completed today  Be sure to continue with exercise, goal should be 3-5x a week with an hour each time.   Increase the variety and intensity of your exercise.   Make sure you are taking your vitamins B12, MVI and calcium.  See the dietician for counseling.   Call with any questions!

## 2024-11-13 ENCOUNTER — HOSPITAL ENCOUNTER (OUTPATIENT)
Dept: RADIOLOGY | Facility: HOSPITAL | Age: 59
Discharge: HOME | End: 2024-11-13
Payer: COMMERCIAL

## 2024-11-13 DIAGNOSIS — Z98.84 S/P GASTRIC BYPASS: ICD-10-CM

## 2024-11-13 DIAGNOSIS — R11.2 NAUSEA AND VOMITING, UNSPECIFIED VOMITING TYPE: ICD-10-CM

## 2024-11-13 PROCEDURE — 74240 X-RAY XM UPR GI TRC 1CNTRST: CPT | Performed by: STUDENT IN AN ORGANIZED HEALTH CARE EDUCATION/TRAINING PROGRAM

## 2024-11-13 PROCEDURE — A9698 NON-RAD CONTRAST MATERIALNOC: HCPCS | Performed by: SURGERY

## 2024-11-13 PROCEDURE — 2500000005 HC RX 250 GENERAL PHARMACY W/O HCPCS: Performed by: SURGERY

## 2024-11-13 PROCEDURE — 74240 X-RAY XM UPR GI TRC 1CNTRST: CPT

## 2024-11-14 ENCOUNTER — TELEMEDICINE (OUTPATIENT)
Dept: SURGERY | Facility: CLINIC | Age: 59
End: 2024-11-14
Payer: COMMERCIAL

## 2024-11-14 DIAGNOSIS — R11.2 NAUSEA AND VOMITING, UNSPECIFIED VOMITING TYPE: ICD-10-CM

## 2024-11-14 DIAGNOSIS — Z98.84 S/P GASTRIC BYPASS: ICD-10-CM

## 2024-11-14 DIAGNOSIS — R11.2 NAUSEA AND VOMITING, UNSPECIFIED VOMITING TYPE: Primary | ICD-10-CM

## 2024-11-14 DIAGNOSIS — K91.2 POSTOPERATIVE MALABSORPTION (HHS-HCC): ICD-10-CM

## 2024-11-14 PROCEDURE — 3044F HG A1C LEVEL LT 7.0%: CPT | Performed by: SURGERY

## 2024-11-14 PROCEDURE — 4010F ACE/ARB THERAPY RXD/TAKEN: CPT | Performed by: SURGERY

## 2024-11-14 PROCEDURE — 3048F LDL-C <100 MG/DL: CPT | Performed by: SURGERY

## 2024-11-14 PROCEDURE — 99213 OFFICE O/P EST LOW 20 MIN: CPT | Mod: 95 | Performed by: SURGERY

## 2024-11-14 PROCEDURE — 99213 OFFICE O/P EST LOW 20 MIN: CPT | Performed by: SURGERY

## 2024-11-14 RX ORDER — OMEPRAZOLE 40 MG/1
40 CAPSULE, DELAYED RELEASE ORAL
Qty: 30 CAPSULE | Refills: 3 | Status: SHIPPED | OUTPATIENT
Start: 2024-11-14

## 2024-11-25 NOTE — PROGRESS NOTES
BARIATRIC SURGERY CLINIC  FOLLOW UP NOTE      Name: Ana Beltran  MRN: [id number]      Index Surgery  Date of Surgery: 2/1/2023   Surgeon: Dr. Yuliana Lau    Surgical Procedure: Laparoscopic mariah en y gastric bypass [id number]    Virtual or Telephone Consent    An interactive audio and video telecommunication system which permits real time communications between the patient (at the originating site) and provider (at the distant site) was utilized to provide this telehealth service.   Verbal consent was requested and obtained from Ana Beltran on this date, 12/06/24 for a telehealth visit.       HPI: 59 year old female with history of Mariah-en-Y gastric bypass in February 2023 presents today as a virtual follow up to review results of CT scan. C/o nausea and vomiting after ingestion of solid proteins.     CT Abd & Pel ---Pending 11/29/24---    Diet Regular (unable to tolerate solid protein presently)     Concerns related to:  Nausea/Vomiting, Reflux: Postprandial vomiting of solid proteins   Abdominal Pain: Denies  Diarrhea/Constipation Diarrhea     DAILY SUPPLEMENTS:  Calcium: Calcium Citrate w/ vitamin D (1200 - 1500mg)  Multivitamin & Minerals: 2 per day  Vitamin B12: 500 mcg/day     PPI:Omeprazole 40mg daily       Upper GI 11/13/24  FINDINGS:  Initial  image demonstrates  an unremarkable bowel gas pattern.      Distal esophagus: Grossly unremarkable with limited visualization at  fluoroscopy      GE junction:  Unremarkable      Gastric pouch: Gastrografin readily passed the GE junction filling  the gastric pouch without evidence of obstruction or extravasation.      Mucosa:  Unremarkable without definite ulceration      Mariah limb: Gastrografin passes without obstruction or extravasation.      Proximal small bowel: Unremarkable without dilatation or mucosal  edema. The jejunal-jejunal anastomosis was not distinctly visualized.      IMPRESSION:  1. Unremarkable upper GI study status post remote  Mariah-en-Y  reconstruction.    Current Outpatient Medications   Medication Sig Dispense Refill    blood sugar diagnostic (OneTouch Verio test strips) strip Check blood sugar twice daily 200 strip 1    blood sugar diagnostic strip Use to check blood sugar twice daily 200 each 1    blood sugar diagnostic strip use to test blood sugar 30 each 1    blood-glucose meter misc use to test blood sugar 1 each 0    carvedilol (Coreg) 3.125 mg tablet Take 1 tablet by mouth two times daily 180 tablet 0    cholecalciferol, vitamin D3, 125 mcg (5,000 unit) tablet,disintegrating Take 5,000 Units by mouth once daily. 90 tablet 0    citalopram (CeleXA) 20 mg tablet take 1 tablet by mouth daily 90 tablet 1    cyanocobalamin (Vitamin B-12) 250 mcg tablet Take 1 tablet by mouth daily 90 tablet 0    cyanocobalamin, vitamin B-12, (Vitamin B-12) 2,500 mcg tablet, sublingual SL tablet take 1 tablet by mouth once daily 30 tablet 3    folic acid/vit B complex and C (B complex-vitamin C-folic acid) 400 mcg tablet extended release Take 1 tablet by mouth once daily. 90 tablet 1    lancets 33 gauge misc Check blood sugar twice daily 200 each 1    losartan (Cozaar) 25 mg tablet TAKE 1 TABLET BY MOUTH ONCE DAILY 60 tablet 0    losartan (Cozaar) 25 mg tablet Take 1 tablet by mouth everyday 90 tablet 1    metFORMIN (Glucophage) 500 mg tablet Take 1 tablet by mouth twice daily 60 tablet 2    omeprazole (PriLOSEC) 40 mg DR capsule Take 1 capsule (40 mg) by mouth once daily in the morning. Take before meals. Please remove capsule and use granules and mix with sugar free pudding or jello 30 capsule 3    ondansetron ODT (Zofran-ODT) 4 mg disintegrating tablet Take 1 tablet (4 mg) by mouth every 8 hours if needed for nausea or vomiting. 60 tablet 0    pitavastatin calcium 2 mg tablet TAKE 1 TABLET BY MOUTH DAILY 90 tablet 0    rosuvastatin (Crestor) 40 mg tablet Take 1 tablet by mouth daily 90 tablet 1    zoster vaccine-recombinant adjuvanted (Shingrix,  PF,) 50 mcg/0.5 mL vaccine INJECT 0.5 mL ONCE,Instr:repeat dose in 2 to 6 months 0.5 each 1     No current facility-administered medications for this visit.       Comorbidities:  Patient Active Problem List   Diagnosis    Acute non-recurrent maxillary sinusitis    Controlled type 2 diabetes mellitus without complication, without long-term current use of insulin ([facility])    Bariatric surgery status    Abdominal pain    Acute viral syndrome    Cellulitis of face    Cough    Depression, major, in remission ([facility])    Dysphagia    HTN (hypertension)    Fatigue    Gastroesophageal reflux disease    Gastrointestinal anastomotic stricture    Hyperlipidemia    Insomnia    Left lower lobe pneumonia    Postoperative nausea    Obesity with body mass index 30 or greater    Obstructive sleep apnea of adult    Overactive bladder    S/P gastric bypass    SOB (shortness of breath) on exertion    Sprain of ankle    Vitamin D deficiency    Nausea and vomiting    Postoperative malabsorption ([facility])         REVIEW OF SYSTEMS:  CONSTITUTIONAL: Patient denies fevers, chills, sweats and weight changes.  CARDIOVASCULAR: Patient denies chest pains, palpitations, orthopnea and paroxysmal nocturnal dyspnea.  RESPIRATORY: No dyspnea on exertion, no wheezing or cough.  GI: No nausea, vomiting, diarrhea, constipation, abdominal pain, hematochezia or melena.  MUSCULOSKELETAL: No myalgias or arthralgias.  NEUROLOGIC: No chronic headaches, no seizures. Patient denies numbness, tingling or weakness.  PSYCHIATRIC: Patient denies problems with mood disturbance. No problems with anxiety.  ENDOCRINE: No excessive urination or excessive thirst.  DERMATOLOGIC: Patient denies any rashes or skin changes.    PHYSICAL EXAM:  There were no vitals taken for this visit.  Alert, well appearing, no acute distress, nourished, hydrated.  Anicteric sclera, no ptosis  Facial symmetry  Neck supple  Unlabored respirations.  Easily conversant without increased  respiratory effort  Oriented to person, place, time.  Judgement intact.  Appropriate mood, affect.       ASSESSMENT & PLAN LOV 11/14/24:  59 y.o. female presenting for follow up visit s/p xiomara-en-y gastric bypass in 2023 with concerns related to nausea and vomiting after ingestion of solid proteins for about the last two weeks.  Patient denies any sick contacts.  Endorses diarrhea that started within the last couple of days. Patient had an upper GI Xray completed yesterday.     Upper GI was reportedly normal I do not see any significant stricture or any other problems.    Etiology of her symptoms is unclear.  She was recently placed on Decadron for upper respiratory tract issues and was not on PPI during that time.  She could have developed an ulcer versus other issues which need further workup.      Plan:    Will start omeprazole 40 mg daily  Will get a CT scan abdomen pelvis with p.o. and IV contrast  Continue bariatric diet, supplements      Follow up after CT scan is done.  ----------------------------------------------------------------    ASSESSMENT & PLAN Today's Visit 12/6/24:    Patient is feeling much better after starting omeprazole.    CT scan was performed and it is negative for any acute intra-abdominal pathology.    She is taking her vitamins and supplements and is tolerating oral intake and bowel function is well    She denies any abdominal pain, nausea, vomiting.    She did not get ultrasound done because she was feeling better.    Recommendations:    Continue omeprazole until next visit    Continue bariatric vitamins and supplements    resume exercise as tolerated    Follow-up 3 months, sooner as needed, to emergency room if acute symptoms.

## 2024-11-26 ENCOUNTER — LAB (OUTPATIENT)
Dept: LAB | Facility: LAB | Age: 59
End: 2024-11-26
Payer: COMMERCIAL

## 2024-11-26 DIAGNOSIS — R11.2 NAUSEA AND VOMITING, UNSPECIFIED VOMITING TYPE: ICD-10-CM

## 2024-11-26 DIAGNOSIS — K91.2 POSTOPERATIVE MALABSORPTION (HHS-HCC): ICD-10-CM

## 2024-11-26 DIAGNOSIS — E55.9 VITAMIN D DEFICIENCY: ICD-10-CM

## 2024-11-26 DIAGNOSIS — Z98.84 S/P GASTRIC BYPASS: ICD-10-CM

## 2024-11-26 LAB
25(OH)D3 SERPL-MCNC: 20 NG/ML (ref 30–100)
BASOPHILS # BLD AUTO: 0.05 X10*3/UL (ref 0–0.1)
BASOPHILS NFR BLD AUTO: 0.8 %
CREAT SERPL-MCNC: 0.63 MG/DL (ref 0.5–1.05)
EGFRCR SERPLBLD CKD-EPI 2021: >90 ML/MIN/1.73M*2
EOSINOPHIL # BLD AUTO: 0.02 X10*3/UL (ref 0–0.7)
EOSINOPHIL NFR BLD AUTO: 0.3 %
ERYTHROCYTE [DISTWIDTH] IN BLOOD BY AUTOMATED COUNT: 12.1 % (ref 11.5–14.5)
FERRITIN SERPL-MCNC: 144 NG/ML (ref 8–150)
FOLATE SERPL-MCNC: 5.9 NG/ML
HCT VFR BLD AUTO: 43.3 % (ref 36–46)
HGB BLD-MCNC: 15 G/DL (ref 12–16)
IMM GRANULOCYTES # BLD AUTO: 0.02 X10*3/UL (ref 0–0.7)
IMM GRANULOCYTES NFR BLD AUTO: 0.3 % (ref 0–0.9)
IRON SATN MFR SERPL: 32 % (ref 25–45)
IRON SERPL-MCNC: 107 UG/DL (ref 35–150)
LYMPHOCYTES # BLD AUTO: 1.05 X10*3/UL (ref 1.2–4.8)
LYMPHOCYTES NFR BLD AUTO: 16.2 %
MCH RBC QN AUTO: 29.6 PG (ref 26–34)
MCHC RBC AUTO-ENTMCNC: 34.6 G/DL (ref 32–36)
MCV RBC AUTO: 85 FL (ref 80–100)
MONOCYTES # BLD AUTO: 0.32 X10*3/UL (ref 0.1–1)
MONOCYTES NFR BLD AUTO: 4.9 %
NEUTROPHILS # BLD AUTO: 5.02 X10*3/UL (ref 1.2–7.7)
NEUTROPHILS NFR BLD AUTO: 77.5 %
NRBC BLD-RTO: 0 /100 WBCS (ref 0–0)
PLATELET # BLD AUTO: 331 X10*3/UL (ref 150–450)
PTH-INTACT SERPL-MCNC: 53.7 PG/ML (ref 18.5–88)
RBC # BLD AUTO: 5.07 X10*6/UL (ref 4–5.2)
TIBC SERPL-MCNC: 338 UG/DL (ref 240–445)
UIBC SERPL-MCNC: 231 UG/DL (ref 110–370)
WBC # BLD AUTO: 6.5 X10*3/UL (ref 4.4–11.3)

## 2024-11-26 PROCEDURE — 83540 ASSAY OF IRON: CPT

## 2024-11-26 PROCEDURE — 84425 ASSAY OF VITAMIN B-1: CPT

## 2024-11-26 PROCEDURE — 83970 ASSAY OF PARATHORMONE: CPT

## 2024-11-26 PROCEDURE — 82525 ASSAY OF COPPER: CPT

## 2024-11-26 PROCEDURE — 36415 COLL VENOUS BLD VENIPUNCTURE: CPT

## 2024-11-26 PROCEDURE — 84630 ASSAY OF ZINC: CPT

## 2024-11-26 PROCEDURE — 82746 ASSAY OF FOLIC ACID SERUM: CPT

## 2024-11-26 PROCEDURE — 82306 VITAMIN D 25 HYDROXY: CPT

## 2024-11-26 PROCEDURE — 85025 COMPLETE CBC W/AUTO DIFF WBC: CPT

## 2024-11-26 PROCEDURE — 83550 IRON BINDING TEST: CPT

## 2024-11-26 PROCEDURE — 82728 ASSAY OF FERRITIN: CPT

## 2024-11-26 PROCEDURE — 82565 ASSAY OF CREATININE: CPT

## 2024-11-27 RX ORDER — ERGOCALCIFEROL 1.25 MG/1
1.25 CAPSULE ORAL 2 TIMES WEEKLY
Qty: 16 CAPSULE | Refills: 0 | Status: SHIPPED | OUTPATIENT
Start: 2024-11-28 | End: 2025-01-23

## 2024-11-28 LAB
COPPER SERPL-MCNC: 113.8 UG/DL (ref 80–155)
ZINC SERPL-MCNC: 79.6 UG/DL (ref 60–120)

## 2024-11-29 ENCOUNTER — HOSPITAL ENCOUNTER (OUTPATIENT)
Dept: RADIOLOGY | Facility: HOSPITAL | Age: 59
Discharge: HOME | End: 2024-11-29
Payer: COMMERCIAL

## 2024-11-29 DIAGNOSIS — R11.2 NAUSEA AND VOMITING, UNSPECIFIED VOMITING TYPE: ICD-10-CM

## 2024-11-29 DIAGNOSIS — Z98.84 S/P GASTRIC BYPASS: ICD-10-CM

## 2024-11-29 LAB — VIT B1 PYROPHOSHATE BLD-SCNC: 142 NMOL/L (ref 70–180)

## 2024-11-29 PROCEDURE — 74177 CT ABD & PELVIS W/CONTRAST: CPT

## 2024-11-29 PROCEDURE — 2550000001 HC RX 255 CONTRASTS: Performed by: SURGERY

## 2024-12-02 ENCOUNTER — TELEPHONE (OUTPATIENT)
Dept: SURGERY | Facility: CLINIC | Age: 59
End: 2024-12-02
Payer: COMMERCIAL

## 2024-12-02 DIAGNOSIS — Z98.84 S/P GASTRIC BYPASS: ICD-10-CM

## 2024-12-02 DIAGNOSIS — R11.2 NAUSEA AND VOMITING, UNSPECIFIED VOMITING TYPE: ICD-10-CM

## 2024-12-02 DIAGNOSIS — K91.2 POSTOPERATIVE MALABSORPTION (HHS-HCC): ICD-10-CM

## 2024-12-02 NOTE — TELEPHONE ENCOUNTER
Patient returned call to schedule EGD Manometry,  Patient does not want to schedule EGD/Manometry or Ultrasound that was requested.  Patient still wants to keep her scheduled appointment with  on 12/6

## 2024-12-05 ENCOUNTER — APPOINTMENT (OUTPATIENT)
Dept: SURGERY | Facility: CLINIC | Age: 59
End: 2024-12-05
Payer: COMMERCIAL

## 2024-12-06 ENCOUNTER — TELEMEDICINE (OUTPATIENT)
Dept: SURGERY | Facility: CLINIC | Age: 59
End: 2024-12-06
Payer: COMMERCIAL

## 2024-12-06 VITALS — WEIGHT: 163 LBS | BODY MASS INDEX: 24.78 KG/M2

## 2024-12-06 DIAGNOSIS — R11.2 NAUSEA AND VOMITING, UNSPECIFIED VOMITING TYPE: ICD-10-CM

## 2024-12-06 DIAGNOSIS — Z98.84 BARIATRIC SURGERY STATUS: Primary | ICD-10-CM

## 2024-12-06 DIAGNOSIS — N32.81 OVERACTIVE BLADDER: ICD-10-CM

## 2024-12-06 DIAGNOSIS — Z98.84 S/P GASTRIC BYPASS: ICD-10-CM

## 2024-12-06 PROCEDURE — 3044F HG A1C LEVEL LT 7.0%: CPT | Performed by: SURGERY

## 2024-12-06 PROCEDURE — 99213 OFFICE O/P EST LOW 20 MIN: CPT | Performed by: SURGERY

## 2024-12-06 PROCEDURE — 4010F ACE/ARB THERAPY RXD/TAKEN: CPT | Performed by: SURGERY

## 2024-12-06 PROCEDURE — 99213 OFFICE O/P EST LOW 20 MIN: CPT | Mod: 95 | Performed by: SURGERY

## 2024-12-06 PROCEDURE — 3048F LDL-C <100 MG/DL: CPT | Performed by: SURGERY

## 2024-12-06 NOTE — TELEPHONE ENCOUNTER
Return ED chest pain, shortness of air, fever, syncope, palpitations, abdominal pain, worse condition, any other concerns   Spoke with patient regarding her mychart message.  Reviewed plan in detail and will follow up with mychart message.

## 2024-12-20 ENCOUNTER — APPOINTMENT (OUTPATIENT)
Dept: SURGERY | Facility: CLINIC | Age: 59
End: 2024-12-20
Payer: COMMERCIAL

## 2025-01-27 PROCEDURE — RXMED WILLOW AMBULATORY MEDICATION CHARGE

## 2025-01-30 ENCOUNTER — PHARMACY VISIT (OUTPATIENT)
Dept: PHARMACY | Facility: CLINIC | Age: 60
End: 2025-01-30
Payer: COMMERCIAL

## 2025-02-19 ENCOUNTER — APPOINTMENT (OUTPATIENT)
Dept: PHYSICAL THERAPY | Facility: CLINIC | Age: 60
End: 2025-02-19
Payer: COMMERCIAL

## 2025-03-05 ENCOUNTER — APPOINTMENT (OUTPATIENT)
Dept: PHYSICAL THERAPY | Facility: CLINIC | Age: 60
End: 2025-03-05
Payer: COMMERCIAL

## 2025-03-05 ENCOUNTER — DOCUMENTATION (OUTPATIENT)
Dept: PHYSICAL THERAPY | Facility: CLINIC | Age: 60
End: 2025-03-05
Payer: COMMERCIAL

## 2025-03-05 PROBLEM — M75.02 ADHESIVE CAPSULITIS OF LEFT SHOULDER: Status: ACTIVE | Noted: 2025-03-05

## 2025-03-05 NOTE — PROGRESS NOTES
Physical Therapy                 Therapy Communication Note    Patient Name: Ana Beltran  MRN: [id number]  Department:   Room: Room/bed info not found  Today's Date: 3/5/2025     Discipline: Physical Therapy          Missed Visit Reason:      Missed Time: Cancel    Comment: Called to cancel PT evaluation due to a family emergency

## 2025-03-05 NOTE — PROGRESS NOTES
"PHYSICAL THERAPY EVALUATION AND TREATMENT    Patient Name: Ana Beltran  MRN: [id number]  Today's Date: 3/5/2025                              Insurance:  Visit number: 1 (updated 03/05/25)  Insurance Type: Payor:  EMPLOYEE MEDICAL PLAN / Plan:  EMPLOYEE MEDICAL PLAN CONSUMER [employer] / Product Type: *No Product type* / EVAL $185.00// 30 PT/OT V PCY 0 USED NEEDS AUTH CONTIGO PAYS AT 90% AFTER DED 3500.00 12.95 APPLIED OOP 5100.00 12.95 APPLIED   Authorization or Plan of Care date Range: TBD  Surgery: No surgery found, No surgery found.  Days since surgery: No surgery found   Referred by: Fidel Guillen MD     Assessment:        Ana Beltran  is a 59 y.o. old patient who participated in a physical therapy evaluation today due to ***. Ana presents with signs and symptoms consistent with ***. Ana's impairments include: {talimpair:66220}.   Due to these impairments, she has the following functional limitations and participation restrictions: {talfl:52274}. Ana's clinical presentation is {talClinicalPresentation:54470::\"Stable and/or uncomplicated characteristics\"} with the level of complexity being {talcomplexity:16837::\"low\"}. Ana's potential for rehab is {talprognosis:39734::\"good\"}.  Skilled physical therapy services are appropriate and beneficial in order to achieve measurable and meaningful change in the objective tests and measures. Utilization of skilled physical therapy services will aid in advancing her functional status and attaining her therapy-related goals. Ana verbalized understanding and is in agreement with all goals and plan of care.  Ana left session with all questions answered and no increase in symptoms.      Plan:       NV: ***    Therapy Diagnosis:   Problem List Items Addressed This Visit             ICD-10-CM    Adhesive capsulitis of left shoulder - Primary M75.02       Subjective    Ana Beltran  is a 59 y.o. female  presenting to the clinic with chief complaint of " "    Onset: ***    NATHAN: ***    Pain location:  ***  Pain description:  ***   ***/10 at worst; ***/10 at best    Worse with:  ***  Better with:  ***    Denies N/T   Denies radiating pain   Denies bowel/bladder incontinence    Prior treatments/interventions:  ***    Home: ***  PLOF: ***  CLOF: ***  Functional limitations: ***  Pt's goal: \" ***\"     Work: ***  Exercise: ***  Hobbies: ***    Diagnostic images:   2/3/25: X-RAY of L shoulder - no evidence of fracture dislocation or osteoarthritis left shoulder    Medical History Form: Reviewed (scanned into chart)      Precautions:  Fall Risk: {Fall Risk:83514::\"None\"}    + ***  Denies: CA, pacemaker, DM, HTN, blood thinner medication use, latex allergy, epilepsy/seizures, or other known cardio/neuro/pulmonary problems   Denies unexpected weight loss/gain, night sweats, or night pain.    Previous surgeries include:  ***    Objective   Outcome Measures:    QuickDASH: ***/    Posture: {POSTURE 1 PMR:08899}    Dermatomes B UE:   {Dermatomes UE:01716::\"Patient denies altered sensation in the *** extremities.\"}    Myotomes/Strength (MMT in sitting):  Shoulder Elevation (C4): L {MMT 0-5:41700::\"5\"}, R {MMT 0-5:04761::\"5\"}  Shoulder Abduction (C5): L {MMT 0-5:94350::\"5\"}, R {MMT 0-5:60441::\"5\"}  Shoulder Flexion: L {MMT 0-5:21459::\"5\"}, R {MMT 0-5:63899::\"5\"}  Shoulder Extension: L {MMT 0-5:92203::\"5\"}, R {MMT 0-5:72991::\"5\"}  Shoulder IR: L {MMT 0-5:93684::\"5\"}, R {MMT 0-5:94793::\"5\"}  Shoulder ER: L {MMT 0-5:43940::\"5\"}, R {MMT 0-5:95082::\"5\"}  Elbow Flexion/Wrist Ext (C6): L {MMT 0-5:35707::\"5\"}, R {MMT 0-5:73319::\"5\"}  Elbow Extension/Wrist Flexion (C7): L {MMT 0-5:31386::\"5\"}, R {MMT 0-5:41617::\"5\"}  Thumb Ext/Ulnar Deviation (C8): L {MMT 0-5:76370::\"5\"}, R {MMT 0-5:15181::\"5\"}  Hand Intrinsics- abd/add (T1): L {MMT 0-5:47504::\"5\"}, R {MMT 0-5:10573::\"5\"}    Rhomboid Strength: L {MMT 0-5:85257::\"5\"}, R {MMT 0-5:45016::\"5\"}  Middle Trapezius Strength: L {MMT 0-5:34540::\"5\"}, " "R {MMT 0-5:89268::\"5\"}  Lower Trapezius Strength: L {MMT 0-5:70430::\"5\"}, R {MMT 0-5:73610::\"5\"}    Range of Motion: AROM in standing via goniometer in degrees, * indicates pain  {Shoulder ROM:04227}    Special Tests:  {Special Tests Shoulder:61196}    Palpation: Ana's {RIGHT/LEFT:36737} shoulder palpation exam findings consist of {Shoulder palpation positives:33427}, {Shoulder palpation Negatives:84222}.        Treatments:    Assigned HEP- [facility] ***    {talbillingoptions:80392}    EDUCATION:       -Educated Ana  on the role of PT and PT POC  -Educated Ana regarding benefit and purpose of skilled PT services along with results of examination findings and how this correlates to their chief complaint.   -Answered Ana's questions in full.  -Educated Ana on relevant anatomy and the rationale for exercises  -Ana A Devin advised to hold any ex if it increases pain, Ana Beltran verbalized understanding    Goals:        STG's (within 2 weeks)  Ana A Devin will decrease pain by >/= 2/10 points from baseline to improve ability to perform household/recreational activities.    Ana CARDENAS Devin will be able to sleep through the night with less than 2 interruptions due to pain in order to improve mental and physical well-being in order to safely participate in ADLs.     Ana THERESA Devin will demonstrate independence,  excellent understanding of and report compliance with at least 3 exercises in her HEP to facilitate the learning process to maximize PT benefits and to facilitate independent rehab program upon discharge.    LTG's (by discharge)    Patient will report </=1/10 shoulder pain to improve comfort with ADL performance per pt PLOF.     Patient will demonstrate improvements in sitting and standing posture without cueing from therapist during 45 minute session    Patient will demonstrate appropriate and safe body mechanics with work related activities to manage pain and prevent further " injury    Patient will improve *** shoulder flexion and abduction strength to >/=4+/5 for improved overhead lifting performance.    Patient will improve *** shoulder flexion and abduction AROM to >/=0-160 degrees for improved overhead shoulder mobility with reaching.    Patient will improve *** shoulder IR and ER AROM by >/= 10 degrees for improved rotational shoulder mobility required for dressing/bathing.    Ana A Devin will report 50% improvement in sensation to indicate healing and to improve household/recreational activities.    Ana A Devin will report 50% reduction in tenderness to palpation to indicate healing and to improve household/recreational activities.    Ana A Devin will report one full day of work (full duty) without pain to indicate ability to return to work and ADLs without limitation.    Ana A Devin will report driving without pain to allow for return to work and ADLs without limitation.     Ana A Devin will improve Quick DASH score by 8 points (minimal clinically important difference)  or <20% to indicate a significant improvement in overall function showing that they are able to complete functional activities and ADLs such as dressing, grooming, bathing, transportation needs, and home care with minimal to no pain or modifications. Baseline  %    Ana A Devin will demo 4+/5 RTC and deltoid strength (all planes) to allow for correct UE mechanics.    Ana A Devin will demo WNL/symmetrical shoulder ROM in all planes without pain to allow for correct mechanics with functional mobility.    Ana A Devin will demonstrate good posture with <2 cues for correction during 45 minute treatment session in order to enhance body mechanics with ADLs, functional mobility, and occupational duties.    Ana A Devin will report ability to lift >10 lbs overhead without pain to allow for work and ADLs without limitation.      Ana A Devin will decrease pain to 0-2/10 to  improve ability to perform household/recreational activities.    Ana THERESA Devin will be able to sleep through the night without waking due to pain in order to improve mental and physical well-being in order to safely participate in ADLs.     Ana Crenshawer will demonstrate independence,  excellent understanding of and report compliance with HEP to facilitate the learning process to maximize PT benefits and to facilitate independent rehab program upon discharge.

## 2025-03-10 ENCOUNTER — APPOINTMENT (OUTPATIENT)
Dept: PHYSICAL THERAPY | Facility: CLINIC | Age: 60
End: 2025-03-10
Payer: COMMERCIAL

## 2025-03-28 NOTE — PROGRESS NOTES
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  Days since surgery: No surgery found   Referred by: Fidel Guillen MD     Assessment:        Ana Beltran  is a 60 y.o. old patient who participated in a physical therapy evaluation today due to ***. Ana presents with signs and symptoms consistent with ***. Ana's impairments include: {talimpair:74830}.   Due to these impairments, she has the following functional limitations and participation restrictions: {talfl:24827}. Ana's clinical presentation is {talClinicalPresentation:33863::\"Stable and/or uncomplicated characteristics\"} with the level of complexity being {talcomplexity:85103::\"low\"}. Ana's potential for rehab is {talprognosis:57773::\"good\"}.  Skilled physical therapy services are appropriate and beneficial in order to achieve measurable and meaningful change in the objective tests and measures. Utilization of skilled physical therapy services will aid in advancing her functional status and attaining her therapy-related goals. Ana verbalized understanding and is in agreement with all goals and plan of care.  Ana left session with all questions answered and no increase in symptoms.      Plan:       NV: ***    Therapy Diagnosis:   Problem List Items Addressed This Visit             ICD-10-CM    Adhesive capsulitis of left shoulder - Primary M75.02       Subjective    Ana Beltran  is a 60 y.o. female  presenting to the clinic with chief complaint of " "    Onset: ***    NATHAN: ***    Pain location:  ***  Pain description:  ***   ***/10 at worst; ***/10 at best    Worse with:  ***  Better with:  ***    Denies N/T   Denies radiating pain   Denies bowel/bladder incontinence    Prior treatments/interventions:  ***    Home: ***  PLOF: ***  CLOF: ***  Functional limitations: ***  Pt's goal: \" ***\"     Work: ***  Exercise: ***  Hobbies: ***    Diagnostic images:   2/3/25 - X-RAY of L shoulder: X-rays obtained [facility]ina view left shoulder shows no evidence of fracture dislocation or osteoarthritis     Medical History Form: Reviewed (scanned into chart)      Precautions:  Fall Risk: {Fall Risk:11214::\"None\"}    + ***  Denies: CA, pacemaker, DM, HTN, blood thinner medication use, latex allergy, epilepsy/seizures, or other known cardio/neuro/pulmonary problems   Denies unexpected weight loss/gain, night sweats, or night pain.    Previous surgeries include:  ***    Objective   Outcome Measures:    QuickDASH: ***/100 = ***%    Posture: {POSTURE 1 PMR:80920}    Dermatomes B UE:   {Dermatomes UE:40922::\"Patient denies altered sensation in the *** extremities.\"}    Myotomes/Strength (MMT in sitting):  Shoulder Elevation (C4): L {MMT 0-5:13946::\"5\"}, R {MMT 0-5:21890::\"5\"}  Shoulder Abduction (C5): L {MMT 0-5:16694::\"5\"}, R {MMT 0-5:23637::\"5\"}  Shoulder Flexion: L {MMT 0-5:02983::\"5\"}, R {MMT 0-5:18738::\"5\"}  Shoulder Extension: L {MMT 0-5:09103::\"5\"}, R {MMT 0-5:94221::\"5\"}  Shoulder IR: L {MMT 0-5:20966::\"5\"}, R {MMT 0-5:02814::\"5\"}  Shoulder ER: L {MMT 0-5:56975::\"5\"}, R {MMT 0-5:52723::\"5\"}  Elbow Flexion/Wrist Ext (C6): L {MMT 0-5:38567::\"5\"}, R {MMT 0-5:12056::\"5\"}  Elbow Extension/Wrist Flexion (C7): L {MMT 0-5:56232::\"5\"}, R {MMT 0-5:07403::\"5\"}  Thumb Ext/Ulnar Deviation (C8): L {MMT 0-5:82992::\"5\"}, R {MMT 0-5:17304::\"5\"}  Hand Intrinsics- abd/add (T1): L {MMT 0-5:91401::\"5\"}, R {MMT 0-5:86356::\"5\"}    Range of Motion: AROM in standing via goniometer in degrees, * " indicates pain  Shoulder Flexion L/R: ***  Shoulder Abduction L/R: ***  Shoulder Extension L/R: ***  Shoulder ER L/R: ***  Shoulder IR L/R: ***    Special Tests:  {Special Tests Shoulder:98553}    Palpation: ***        Treatments:    Assigned HEP- [facility] ***    {talbillingoptions:75206}    EDUCATION:       -Educated Ana  on the role of PT and PT POC  -Educated Ana regarding benefit and purpose of skilled PT services along with results of examination findings and how this correlates to their chief complaint.   -Answered Ana's questions in full.  -Educated Ana on relevant anatomy and the rationale for exercises  -Ana Makgler advised to hold any ex if it increases pain, Ana Beltran verbalized understanding    Goals:        STG's (within 2 weeks)  Ana THERESA Devin will decrease pain by >/= 2/10 points from baseline to improve ability to perform household/recreational activities.    Ana Beltran will be able to sleep through the night with less than 2 interruptions due to pain in order to improve mental and physical well-being in order to safely participate in ADLs.     Ana Makgler will demonstrate independence,  excellent understanding of and report compliance with at least 3 exercises in her HEP to facilitate the learning process to maximize PT benefits and to facilitate independent rehab program upon discharge.    LTG's (by discharge)  Patient will report </=1/10 shoulder pain to improve comfort with ADL performance per pt PLOF.     Patient will demonstrate improvements in sitting and standing posture without cueing from therapist during 45 minute session    Patient will demonstrate appropriate and safe body mechanics with work related activities to manage pain and prevent further injury    Patient will improve *** shoulder flexion and abduction strength to >/=4+/5 for improved overhead lifting performance.    Patient will improve *** shoulder flexion and abduction AROM to >/=0-160  degrees for improved overhead shoulder mobility with reaching.    Patient will improve *** shoulder IR and ER AROM by >/= 10 degrees for improved rotational shoulder mobility required for dressing/bathing.    Ana Makgler will report 50% improvement in sensation to indicate healing and to improve household/recreational activities.    Ana A Devin will report 50% reduction in tenderness to palpation to indicate healing and to improve household/recreational activities.    Ana A Devin will report one full day of work (full duty) without pain to indicate ability to return to work and ADLs without limitation.    Ana A Devin will report driving without pain to allow for return to work and ADLs without limitation.     Ana A Devin will improve Quick DASH score by 8 points (minimal clinically important difference)  or <20% to indicate a significant improvement in overall function showing that they are able to complete functional activities and ADLs such as dressing, grooming, bathing, transportation needs, and home care with minimal to no pain or modifications. Baseline  %    Ana A Devin will demo 4+/5 RTC and deltoid strength (all planes) to allow for correct UE mechanics.    Ana A Devin will demo WNL/symmetrical shoulder ROM in all planes without pain to allow for correct mechanics with functional mobility.    Ana A Devin will demonstrate good posture with <2 cues for correction during 45 minute treatment session in order to enhance body mechanics with ADLs, functional mobility, and occupational duties.    Ana A Devin will report ability to lift >10 lbs overhead without pain to allow for work and ADLs without limitation.      Naa A Devin will decrease pain to 0-2/10 to improve ability to perform household/recreational activities.    Ana A Devin will be able to sleep through the night without waking due to pain in order to improve mental and physical well-being in order  to safely participate in ADLs.     Ana Crenshawer will demonstrate independence,  excellent understanding of and report compliance with HEP to facilitate the learning process to maximize PT benefits and to facilitate independent rehab program upon discharge.

## 2025-03-31 ENCOUNTER — DOCUMENTATION WITH CHARGES (OUTPATIENT)
Dept: PHYSICAL THERAPY | Facility: CLINIC | Age: 60
End: 2025-03-31

## 2025-03-31 ENCOUNTER — APPOINTMENT (OUTPATIENT)
Dept: PHYSICAL THERAPY | Facility: CLINIC | Age: 60
End: 2025-03-31
Payer: COMMERCIAL

## 2025-03-31 DIAGNOSIS — M75.02 ADHESIVE CAPSULITIS OF LEFT SHOULDER: Primary | ICD-10-CM

## 2025-03-31 NOTE — PROGRESS NOTES
Physical Therapy                      Therapy Communication Note    Patient Name: Ana Beltran  MRN: [id number]  Today's Date: 3/31/2025     Discipline: Physical Therapy          Missed Visit Reason:  Pt called and reported she needs surgery. Canceled physical therapy evaluation.    Missed Time: Cancel

## 2025-04-21 ENCOUNTER — APPOINTMENT (OUTPATIENT)
Dept: PHYSICAL THERAPY | Facility: CLINIC | Age: 60
End: 2025-04-21
Payer: COMMERCIAL

## 2025-04-21 DIAGNOSIS — Z98.890 OTHER SPECIFIED POSTPROCEDURAL STATES: ICD-10-CM

## 2025-04-21 PROCEDURE — 97161 PT EVAL LOW COMPLEX 20 MIN: CPT | Mod: GP | Performed by: INTERNAL MEDICINE

## 2025-04-21 PROCEDURE — 97110 THERAPEUTIC EXERCISES: CPT | Mod: GP | Performed by: INTERNAL MEDICINE

## 2025-04-21 ASSESSMENT — COLUMBIA-SUICIDE SEVERITY RATING SCALE - C-SSRS
2. HAVE YOU ACTUALLY HAD ANY THOUGHTS OF KILLING YOURSELF?: NO
1. IN THE PAST MONTH, HAVE YOU WISHED YOU WERE DEAD OR WISHED YOU COULD GO TO SLEEP AND NOT WAKE UP?: NO
6. HAVE YOU EVER DONE ANYTHING, STARTED TO DO ANYTHING, OR PREPARED TO DO ANYTHING TO END YOUR LIFE?: NO

## 2025-04-21 ASSESSMENT — PATIENT HEALTH QUESTIONNAIRE - PHQ9
2. FEELING DOWN, DEPRESSED OR HOPELESS: NOT AT ALL
SUM OF ALL RESPONSES TO PHQ9 QUESTIONS 1 AND 2: 0
1. LITTLE INTEREST OR PLEASURE IN DOING THINGS: NOT AT ALL

## 2025-04-21 NOTE — PROGRESS NOTES
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  Days since surgery: No surgery found   Referred by: Sunday Webber PA-C     PT Evaluation Time Entry  PT Evaluation (Low) Time Entry: 24  PT Therapeutic Procedures Time Entry  Therapeutic Exercise Time Entry: 15                     Assessment:  PT Assessment  PT Assessment Results: Decreased strength, Decreased range of motion, Decreased endurance, Decreased skin integrity, Pain  Rehab Prognosis: Good  Evaluation/Treatment Tolerance: Patient tolerated treatment well, Patient limited by pain  Barriers to Participation: Capable of completing ADLs semi/independent, Leisure activity, Premorbid level of function     Ana Beltran  is a 60 y.o. old patient who participated in a physical therapy evaluation today s/p L shld manipulation and arthroscopic debridement partial articular sided rotator cuff tear, subacromial decompression/ distal clavicle resection, and extensive bursectomy by Dr. Webber at [facility] on 04/08/25. Pt is post op week 1-2 at time of eval. Ana's impairments include: postural deficits, pain, decreased strength, decreased range of motion, soft tissue dysfunction, and edema.   Due to these impairments, she has the following functional limitations and participation restrictions: difficulty performing household activities, difficulty performing recreational activities, difficulty with sleeping, difficulty with completing/performing some ADLs/IADLs, and increased time  to complete ADLs/IADLs. Ana's clinical presentation is Stable and/or uncomplicated characteristics with the level of complexity being low. Ana's potential for rehab is good.  Skilled physical therapy services are appropriate and beneficial in order to achieve measurable and meaningful change in the objective tests and measures. Utilization of skilled physical therapy services will aid in advancing her functional status and attaining her therapy-related goals. Ana verbalized understanding and is in agreement with all goals and plan of care.  Ana left session with all questions answered and no increase in symptoms.      Plan:  OP PT Plan  Treatment/Interventions: Aquatic therapy, Cryotherapy, Dry needling, Education/ Instruction, Electrical stimulation, Manual therapy, Neuromuscular re-education, Self care/ home management, Taping techniques, Therapeutic activities, Therapeutic exercises, Ultrasound  PT Plan: Skilled PT  PT Frequency: 2 times per week    NV: test strip for taping for bruising, monitor pain levels to progress L shld ROM and strength (encourage ice and try TENs unit PRN)    Therapy Diagnosis:   Problem List Items Addressed This Visit    None  Visit Diagnoses         Codes      Other specified postprocedural states     Z98.890            Subjective    Onset: 04/08/25    NATHAN: s/p L shoulder manipulation and arthroscopic debridement /decompression. Pt reports no new concerns with incision/scar but noted bruising. Pt reports taking Tylenol prior to session. Pt is R hand dominant. Pt denies falls in the past 6 months.    Pain intensity: 0/10  Pain location:  L anterior shld  Pain description:  stiff   6/10 at worst; 0/10 at best    Worse with:  at night, quick movement, laying on it, OH reaching  Better with:  Tylenol, rest    Denies N/T   Denies radiating pain     Prior treatments/interventions:  None    PLOF: Independent with all mobility, ADLs, and iADLs without AD  CLOF: Assist lifting and  "reaching  Functional limitations: opening jars, heavy household chores, washing back, cutting food, recreational activities, lifting, carrying, OH reaching  Pt's goal: \" to be able to have ROM in L arm a + shoulder\"     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 +HBP; medically managed. +Ulcers; medically managed.  Denies: CA, pacemaker, blood thinner medication use, latex allergy, epilepsy/seizures, or other known cardio/neuro/pulmonary problems   Denies unexpected weight loss/gain, night sweats, or night pain.  Previous surgeries include:  none    Objective   Outcome Measures:  Other Measures  Disability of Arm Shoulder Hand (DASH): 23 to 27.27%    Shoulder AROM (degrees)  Flexion R/L:  180/ 80 !  Extension R/L: 55 / 35 p!   Abduction R/L:  180 / 100 p!  Internal Rotation R/L:  90 / 70 p!   External Rotation R/L:  90 / 20 p!    Strength:  Flexion R/L:  4+/5,   2-/5    Abduction R/L:  5/5,  3+/5 p!    Internal Rotation R/L:  5/5,   4/5    External Rotation R/L: 4/5,   4/5      Observation: bruising noted in L jonas-lateral shld and diffuse/light bruising noted in tricep area  Tenderness: L anterior shld, L UT  Spasms: none      KEY  NT = not tested this visit  p! = pain  ~ = approximation        Treatments:  Therapeutic Exercise  Access Code: [email or web address]   - Circular Shoulder Pendulum with Table Support  - 2 x daily - 7 x weekly - 3 sets - 10 reps  - Standing Shoulder Flexion Wall Walk  - 1-2 x daily - 7 x weekly - 2 sets - 8-10 reps  - Supine Shoulder Flexion Extension AAROM with Dowel  - 1-2 " x daily - 7 x weekly - 2 sets - 8-10 reps - 30-5 second hold  - Supine Shoulder External Rotation with Dowel  - 1-2 x daily - 7 x weekly - 2 sets - 10 reps - 3 second hold      EDUCATION:  Outpatient Education  Individual(s) Educated: Patient  Education Provided: Home Exercise Program, POC  Patient/Caregiver Demonstrated Understanding: yes  Plan of Care Discussed and Agreed Upon: yes  Patient Response to Education: Patient/Caregiver Verbalized Understanding of Information, Patient/Caregiver Performed Return Demonstration of Exercises/Activities, Patient/Caregiver Asked Appropriate Questions    -Educated Ana regarding benefit and purpose of skilled PT services along with results of examination findings and how this correlates to their chief complaint.   -Answered Ana's questions in full.  - Instructed pt to ice after initial eval session  - Instructed pt to further avoid heavy lifting for healing purposes      Goals:  ST weeks  1. Patient will be independent with HEP to current functional status    LTGs: 6-8 weeks   1. Patient will report </=1/10 shoulder pain to improve comfort with ADL performance per pt PLOF.   2. Patient will demonstrate improvements in sitting and standing posture without cueing from therapist during 45 minute session  3. Patient will improve QUICKDASH score to </= 19.27 % to indicate improvements in functional activities described on form per pt PLOF.   4. Patient will improve L shoulder flexion and abduction strength to >/=4+/5 with </= 2/10 pain for improved overhead lifting performance.  5. Patient will improve L shoulder flexion and abduction AROM to >/=0-160 degrees for improved overhead shoulder mobility with reaching.  6. Patient will improve L shoulder IR and ER AROM by >/= 10 degrees for improved rotational shoulder mobility required for dressing/bathing.

## 2025-04-26 ENCOUNTER — HOSPITAL ENCOUNTER (OUTPATIENT)
Dept: RADIOLOGY | Facility: CLINIC | Age: 60
Discharge: HOME | End: 2025-04-26
Payer: COMMERCIAL

## 2025-04-26 VITALS — BODY MASS INDEX: 24.71 KG/M2 | WEIGHT: 163 LBS | HEIGHT: 68 IN

## 2025-04-26 DIAGNOSIS — Z12.31 ENCOUNTER FOR SCREENING MAMMOGRAM FOR MALIGNANT NEOPLASM OF BREAST: ICD-10-CM

## 2025-04-26 DIAGNOSIS — Z78.0 ASYMPTOMATIC MENOPAUSAL STATE: ICD-10-CM

## 2025-04-26 PROCEDURE — 77063 BREAST TOMOSYNTHESIS BI: CPT | Mod: BILATERAL PROCEDURE | Performed by: RADIOLOGY

## 2025-04-26 PROCEDURE — 77067 SCR MAMMO BI INCL CAD: CPT | Mod: LIO

## 2025-04-26 PROCEDURE — 77080 DXA BONE DENSITY AXIAL: CPT | Mod: LIO | Performed by: RADIOLOGY

## 2025-04-26 PROCEDURE — 77080 DXA BONE DENSITY AXIAL: CPT | Mod: LIO

## 2025-04-26 PROCEDURE — 77067 SCR MAMMO BI INCL CAD: CPT | Mod: BILATERAL PROCEDURE | Performed by: RADIOLOGY

## 2025-05-01 NOTE — PROGRESS NOTES
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 Webber at [facility] on 04/08/25   Referring Provider: Sunday Webber PA-C     Current Problem  Problem List Items Addressed This Visit    None  Visit Diagnoses         Codes      Other specified postprocedural states     Z98.890              Precautions:  Fall Risk: None    + DM; medically managed. +HBP; medically managed. +Ulcers; medically managed.  Denies: CA, pacemaker, blood thinner medication use, latex allergy, epilepsy/seizures, or other known cardio/neuro/pulmonary problems   Denies unexpected weight loss/gain, night sweats, or night pain.  Previous surgeries include:  none    General  Subjective      Ana Beltran denies any falls or complications since last session. Ana Beltran reports increased L shld pain after grandson accidentally pulling on left arm. Noticed increase in pain and crunching in shld. Pt reports icing shld a lot to manage pain with good relief.    Ana Beltran reports good compliance with HEP.    Pain:  0/10; premedicated prior to session  Pain location: L anterior shld      Objective   Observation: decreased L shld bruising    Treatments:  Abbreviation Key  NC = not completed this visit   NV = next visit  // = parallel    Assigned HEP- [facility] [email or web address]     Therapeutic Exercise:  33 minutes    Standing facing pulleys in to flexion x5 mins; L shld AAROM and subjective  Circular  Shoulder Pendulum with Table Support x2 mins  Supine Shoulder Flexion Extension AAROM with Dowel 10x  Supine Shoulder External Rotation with Dowel 10x  S/l L shld abduction 2x10  Trial B shld ext AAROM with cane; termianted due to pain  Standing B shld IR AAROM with cane 2x10    Verbally reviewed only:   Standing Shoulder Flexion Wall Walk  - 1-2 x daily - 7 x weekly - 2 sets - 8-10 reps      Manual Therapy:  12 minutes   Pt seated:  STM/DTM and myofascial cupping with external gliding to L UT and levator scap      Outpatient Education: Reviewed home exercise program. Home exercise program instructed and issued. Answered questions about home exercise program. Provided manual cues for correct performance of exercises. Provided verbal feedback to improve exercise technique. Cues/rationale for there-ex. Pulleys product hand-out. HEP update and hand-out. DN info sheet and consent form administered     Ana Beltran verbalizes understanding of all education provided: Yes    Assessment:  Ana Beltran completed treatment today which focused upon reviewing, performing current HEP, addressing additional L shld ROM/strength deficits in abduction and IR, and manual tx to LUT in order to address deficits s/p L shld manipulation and arthroscopic debridement partial articular sided rotator cuff tear, subacromial decompression/ distal clavicle resection, and extensive bursectomy by Dr. Webber at [facility] on 04/08/25 .  Ana presents with no pain at rest, decreased L shld bruising, diminished L shld ROM and strength.  Ana requires cues to avoid LUT and R trunk SB compensation during L shld elevation during pulley activity with good carry-over.  Ana was challenged with L shld elevation, ER, and extension due to elevated pain; will monitor as pt had recent injury to LUE. Pt demos L UT soft tissue restriction that improves post manual tx interventions.   Ana's  response to tx was: Patient tolerated therapeutic interventions  well and without any adverse events. Tolerated treatment session well without any increase in pain. Improved independence with home exercises. Reduced pain post treatment. Patient required increased cuing and demonstrations for exercises which increases time required for interventions..   Ana's Progress towards goals: Patient reports there has not been a significant change in functional abilities.  Ana Beltran continues to have decreased strength, decreased ROM, and pain limiting participation in household chores, recreational activities, exercise, sleep, attending medical appointments, and shopping within the community. Further skilled therapy services are warranted to address the remaining impairments in order to progress towards functional goals. Ana left session with all questions answered and no increase in symptoms.      Plan:  Monitor pain levels and HEP update and progress L shld ROM and strength as phill      Time Calculation  Start Time: 1517  Stop Time: 1602  Time Calculation (min): 45 min     PT Therapeutic Procedures Time Entry  Manual Therapy Time Entry: 12  Therapeutic Exercise Time Entry: 33

## 2025-05-05 ENCOUNTER — TREATMENT (OUTPATIENT)
Dept: PHYSICAL THERAPY | Facility: CLINIC | Age: 60
End: 2025-05-05
Payer: COMMERCIAL

## 2025-05-05 DIAGNOSIS — Z98.890 OTHER SPECIFIED POSTPROCEDURAL STATES: ICD-10-CM

## 2025-05-05 PROCEDURE — 97110 THERAPEUTIC EXERCISES: CPT | Mod: GP | Performed by: INTERNAL MEDICINE

## 2025-05-05 PROCEDURE — 97140 MANUAL THERAPY 1/> REGIONS: CPT | Mod: GP | Performed by: INTERNAL MEDICINE

## 2025-05-12 NOTE — PROGRESS NOTES
Physical Therapy    Physical Therapy Treatment    Patient Name: Ana Beltran  MRN: [id number]  Today's Date: 5/13/2025  Time Calculation  Start Time: 1526  Stop Time: 1605  Time Calculation (min): 39 min    Insurance - reviewed   Visit number: 3/8 (updated 05/13/25)   Payor:  EMPLOYEE MEDICAL PLAN / Plan:  EMPLOYEE MEDICAL PLAN [employer]SUM[employer] / Product Type: *No Product type* /    **PT AUTH 8V 04/21-06/30/25**   Surgery: s/p L shld manipulation and arthroscopic debridement partial articular sided rotator cuff tear, subacromial decompression/ distal clavicle resection, and extensive bursectomy by Dr. Webber at [facility] on 04/08/25   Referring Provider: Sunday Webber PA-C     Current Problem  Problem List Items Addressed This Visit    None  Visit Diagnoses         Codes      Other specified postprocedural states    -  Primary Z98.890              Precautions:  Fall Risk: None    + DM; medically managed. +HBP; medically managed. +Ulcers; medically managed.  Denies: CA, pacemaker, blood thinner medication use, latex allergy, epilepsy/seizures, or other known cardio/neuro/pulmonary problems   Denies unexpected weight loss/gain, night sweats, or night pain.  Previous surgeries include:  none    General  Subjective      Ana Beltran denies any falls or complications since last session. Ana Beltran reports she is going to contact MD re: L UT and anterior shld pain which was worsened after 1 y/o g-son pulled arm backwards forcefully 2 wks ago: pain has not subsided. + disrupted sleep.      Ana Beltran reports good compliance with HEP.    Pain:  0/10 at rest, 5-6/10 w/ HEP or movement, attempt to put on deodorant, etc  Pain location: L UT down anterior shld to bicep      Objective     5/13: PROM L shld: flexion 110 degrees, ER 40, IR 45 degrees: ROM progressing, + pain prior to end feel all ranges    Treatments:  Abbreviation Key  NC = not completed this visit   NV = next visit  // = parallel    Assigned HEP-  "[employer] [id number]     Therapeutic Exercise:  6 minutes    Pt reports elevated sxs w/ attermpt to perform supine cane shld flexion: educated pt in table slide vs countertop \"walkaway\" instead  Shld ER via sleeper stretch or cane: elevated pain on attempt  Cane shld extension/IR behind back: pt reports had to discontinue due to elevated pain      Manual Therapy:  33 minutes   Pt seated:  STM/DTM and myofascial cupping with external gliding to L ant shld and bicep  Supine PROM : pain prior to end feel all planes  KT tape to inhibit L UT  Grade 1/2 post jt mobs: + stretch, no pain  Grade 1/2 inf jt mobs: not tolerated      Outpatient Education: Reviewed home exercise program. Answered questions about home exercise program. Provided manual cues for correct performance of exercises. Provided verbal feedback to improve exercise technique. In agreement w/ contacting MD.  Discussed how we can alter PT schedule till MD follow up apt.  The rationale for kinesiotape application was discussed with patient.  Pt was advised that the tape will generally stay on for 2-3 days.  If skin irritation, such as redness or itching were to occur, Pt was advised to remove the tape and gently wash the adhesive off.  Pt agreed to kinesiotaping.      Ana Beltran verbalizes understanding of all education provided: Yes    Assessment:  Ana Makgler completed treatment today which focused upon improving L shld ROM via manual therapy and AAROM ex  in order to address deficits s/p L shld manipulation and arthroscopic debridement partial articular sided rotator cuff tear, subacromial decompression/ distal clavicle resection, and extensive bursectomy by Dr. Webber at [facility] on 04/08/25 .  Ana presents with improved Rom since PT began, however, L anterior shld pain present prior to all end ranges.  Manual techniques did not seem to reduce pain.  Ana requires cues for correct technique. Ana was challenged with all ex due to pain limiting, " there ex reduced/modified this date.  Ana's  response to tx was: Improved joint mobility.  Improved independence with home exercises. No change in pain. Patient required increased cuing and demonstrations for exercises which increases time required for interventions..   Ana's Progress towards goals: Patient reports there has not been a significant change in functional abilities.  Ana Beltran continues to have decreased strength, decreased ROM, and pain limiting participation in household chores, recreational activities, exercise, sleep, attending medical appointments, and shopping within the community. Further skilled therapy services are warranted to address the remaining impairments in order to progress towards functional goals. Ana left session with all questions answered and no increase in symptoms.      Plan:  Pt to call MD re: pain. Pt to call and adjust schedule PRN      Time Calculation  Start Time: 1526  Stop Time: 1605  Time Calculation (min): 39 min     PT Therapeutic Procedures Time Entry  Manual Therapy Time Entry: 33  Therapeutic Exercise Time Entry: 6

## 2025-05-13 ENCOUNTER — TREATMENT (OUTPATIENT)
Dept: PHYSICAL THERAPY | Facility: CLINIC | Age: 60
End: 2025-05-13
Payer: COMMERCIAL

## 2025-05-13 DIAGNOSIS — Z98.890 OTHER SPECIFIED POSTPROCEDURAL STATES: Primary | ICD-10-CM

## 2025-05-19 ENCOUNTER — APPOINTMENT (OUTPATIENT)
Dept: PHYSICAL THERAPY | Facility: CLINIC | Age: 60
End: 2025-05-19
Payer: COMMERCIAL

## 2025-05-20 DIAGNOSIS — G89.18 POST-OP PAIN: Primary | ICD-10-CM

## 2025-05-20 RX ORDER — GABAPENTIN 300 MG/1
300 CAPSULE ORAL 3 TIMES DAILY
Qty: 90 CAPSULE | Refills: 0 | Status: SHIPPED | OUTPATIENT
Start: 2025-05-20 | End: 2025-06-19

## 2025-05-20 RX ORDER — TRAMADOL HYDROCHLORIDE 50 MG/1
50 TABLET, FILM COATED ORAL EVERY 6 HOURS PRN
Qty: 15 TABLET | Refills: 0 | Status: SHIPPED | OUTPATIENT
Start: 2025-05-20 | End: 2025-05-27

## 2025-05-21 ENCOUNTER — TREATMENT (OUTPATIENT)
Dept: PHYSICAL THERAPY | Facility: CLINIC | Age: 60
End: 2025-05-21
Payer: COMMERCIAL

## 2025-05-21 ENCOUNTER — DOCUMENTATION (OUTPATIENT)
Dept: PHYSICAL THERAPY | Facility: CLINIC | Age: 60
End: 2025-05-21
Payer: COMMERCIAL

## 2025-05-21 NOTE — PROGRESS NOTES
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 Webber at [facility] on 04/08/25   Referring Provider: Sunday Webber PA-C     Current Problem  Problem List Items Addressed This Visit    None          Precautions:  Fall Risk: None    + DM; medically managed. +HBP; medically managed. +Ulcers; medically managed.  Denies: CA, pacemaker, blood thinner medication use, latex allergy, epilepsy/seizures, or other known cardio/neuro/pulmonary problems   Denies unexpected weight loss/gain, night sweats, or night pain.  Previous surgeries include:  none    General  Subjective      Ana Beltran denies any falls or complications since last session. Ana Beltran reports she is going to contact MD re: L UT and anterior shld pain which was worsened after 1 y/o g-son pulled arm backwards forcefully 2 wks ago: pain has not subsided. + disrupted sleep.      Ana Beltran reports good compliance with HEP.    Pain:  0/10 at rest, 5-6/10 w/ HEP or movement, attempt to put on deodorant, etc  Pain location: L UT down anterior shld to bicep      Objective     5/13: PROM L shld: flexion 110 degrees, ER 40, IR 45 degrees: ROM progressing, + pain prior to end feel all ranges    Treatments:  Abbreviation Key  NC = not completed this visit   NV = next visit  // = parallel    Assigned HEP- [employer] [id number]     Therapeutic Exercise:    minutes    Pt reports elevated sxs w/ attermpt to perform supine cane shld flexion: educated pt in table slide vs countertop  "\"walkaway\" instead  Shld ER via sleeper stretch or cane: elevated pain on attempt  Cane shld extension/IR behind back: pt reports had to discontinue due to elevated pain      Manual Therapy:    minutes   Pt seated:  STM/DTM and myofascial cupping with external gliding to L ant shld and bicep  Supine PROM : pain prior to end feel all planes  KT tape to inhibit L UT  Grade 1/2 post jt mobs: + stretch, no pain  Grade 1/2 inf jt mobs: not tolerated      Outpatient Education: Reviewed home exercise program. Answered questions about home exercise program. Provided manual cues for correct performance of exercises. Provided verbal feedback to improve exercise technique. In agreement w/ contacting MD.  Discussed how we can alter PT schedule till MD follow up apt.  The rationale for kinesiotape application was discussed with patient.  Pt was advised that the tape will generally stay on for 2-3 days.  If skin irritation, such as redness or itching were to occur, Pt was advised to remove the tape and gently wash the adhesive off.  Pt agreed to kinesiotaping.      Ana Beltran verbalizes understanding of all education provided: Yes    Assessment:  Anaceli Beltran completed treatment today which focused upon improving L shld ROM via manual therapy and AAROM ex  in order to address deficits s/p L shld manipulation and arthroscopic debridement partial articular sided rotator cuff tear, subacromial decompression/ distal clavicle resection, and extensive bursectomy by Dr. Webber at [facility] on 04/08/25 .  Ana presents with improved Rom since PT began, however, L anterior shld pain present prior to all end ranges.  Manual techniques did not seem to reduce pain.  Ana requires cues for correct technique. Ana was challenged with all ex due to pain limiting, there ex reduced/modified this date.  Ana's  response to tx was: Improved joint mobility.  Improved independence with home exercises. No change in pain. Patient required " increased cuing and demonstrations for exercises which increases time required for interventions..   Ana's Progress towards goals: Patient reports there has not been a significant change in functional abilities.  Ana Beltran continues to have decreased strength, decreased ROM, and pain limiting participation in household chores, recreational activities, exercise, sleep, attending medical appointments, and shopping within the community. Further skilled therapy services are warranted to address the remaining impairments in order to progress towards functional goals. Ana left session with all questions answered and no increase in symptoms.      Plan:  Pt to call MD re: pain. Pt to call and adjust schedule PRN

## 2025-05-21 NOTE — PROGRESS NOTES
Physical Therapy                    Therapy Communication Note    Patient Name: Ana Beltran  MRN: [id number]  Department:   Room: Room/bed info not found  Today's Date: 5/21/2025     Discipline: Physical Therapy    Missed Visit Reason: Per chart review, pt canceled PT session this morning via MyChart    Missed Time: Cancel

## 2025-05-27 ENCOUNTER — DOCUMENTATION (OUTPATIENT)
Dept: PHYSICAL THERAPY | Facility: CLINIC | Age: 60
End: 2025-05-27
Payer: COMMERCIAL

## 2025-05-27 PROCEDURE — RXMED WILLOW AMBULATORY MEDICATION CHARGE

## 2025-05-27 NOTE — PROGRESS NOTES
Physical Therapy                 Therapy Communication Note    Patient Name: Ana Beltran  MRN: [id number]  Department:   Room: Room/bed info not found  Today's Date: 5/27/2025     Discipline: Physical Therapy    Missed Visit:       Missed Visit Reason:  Called and left message reminding pt of 6/2 PT appt, she is to call us if cannot attend    Pt called us back: requesting we DISCHARGE shld from PT, as MD will be placing new PT order for SPINE instead     Missed Time: No Show    Comment:

## 2025-05-29 ENCOUNTER — PHARMACY VISIT (OUTPATIENT)
Dept: PHARMACY | Facility: CLINIC | Age: 60
End: 2025-05-29
Payer: COMMERCIAL

## 2025-06-02 ENCOUNTER — APPOINTMENT (OUTPATIENT)
Dept: PHYSICAL THERAPY | Facility: CLINIC | Age: 60
End: 2025-06-02
Payer: COMMERCIAL

## 2025-06-09 ENCOUNTER — APPOINTMENT (OUTPATIENT)
Dept: PHYSICAL THERAPY | Facility: CLINIC | Age: 60
End: 2025-06-09
Payer: COMMERCIAL

## 2025-06-30 ENCOUNTER — APPOINTMENT (OUTPATIENT)
Dept: PHYSICAL THERAPY | Facility: CLINIC | Age: 60
End: 2025-06-30
Payer: COMMERCIAL

## 2025-06-30 ENCOUNTER — DOCUMENTATION (OUTPATIENT)
Dept: PHYSICAL THERAPY | Facility: CLINIC | Age: 60
End: 2025-06-30

## 2025-06-30 NOTE — PROGRESS NOTES
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  Days since surgery: No surgery found   Referred by: Fidel Guillen MD     Assessment:        Ana Beltran  is a 60 y.o. old patient who participated in a physical therapy evaluation today due to ***. Ana presents with signs and symptoms consistent with ***. Ana's impairments include: {talimpair:06743}.   Due to these impairments, she has the following functional limitations and participation restrictions: {talfl:18689}. Ana's clinical presentation is {talClinicalPresentation:27932::\"Stable and/or uncomplicated characteristics\"} with the level of complexity being {talcomplexity:00303::\"low\"}. Ana's potential for rehab is {talprognosis:61179::\"good\"}.  Skilled physical therapy services are appropriate and beneficial in order to achieve measurable and meaningful change in the objective tests and measures. Utilization of skilled physical therapy services will aid in advancing her functional status and attaining her therapy-related goals. Ana verbalized understanding and is in agreement with all goals and plan of care.  Ana left session with all questions answered and no increase in symptoms.      Plan:       NV: ***    Therapy Diagnosis:   Problem List Items Addressed This Visit    None      Subjective    Ana Beltran  is a 60 y.o. female  presenting to the clinic with chief complaint of     Onset: ***    NATHAN: ***    Pain location:  ***  Pain description:  ***   ***/10 at worst; ***/10 at " "best    Worse with:  ***  Better with:  ***    Denies N/T   Denies radiating pain   Denies bowel/bladder incontinence    Prior treatments/interventions:  ***    Home: ***  PLOF: ***  CLOF: ***  Functional limitations: ***  Pt's goal: \" ***\"     Work: ***  Exercise: ***  Hobbies: ***    Diagnostic images:   5/19/25 X-RAY of L shoulder - no evidence of fracture, dislocation, or osteoarthritis  5/19/25 X-RAY of cervical spine - loss of cervical lordosis with evidence of extensive degenerative changes with anterior osteophyte   formation disc space narrowing at C4-5, C5-6, and C6-7     Medical History Form: Reviewed (scanned into chart)    Precautions:  Fall Risk: {Fall Risk:96755::\"None\"}    + ***  Denies: CA, pacemaker, DM, HTN, blood thinner medication use, latex allergy, epilepsy/seizures, or other known cardio/neuro/pulmonary problems   Denies unexpected weight loss/gain, night sweats, or night pain.    Previous surgeries include:  shoulder arthroscopy 4/8/25    Objective   Outcome Measures:    NDI: ***/50    Posture: {POSTURE 1 PMR:43469}    Dermatomes BUE:   Upper Trapezius (C4) L: {INTACT/IMPAIRED:87494}; R: {INTACT/IMPAIRED:36122}  Lateral Deltoid (C5) L: {INTACT/IMPAIRED:84280}; R: {INTACT/IMPAIRED:70066}  Tip of 1st Digit (C6) L: {INTACT/IMPAIRED:02481}; R: {INTACT/IMPAIRED:45677}  Tip of 3rd Digit (C7) L: {INTACT/IMPAIRED:45290}; R: {INTACT/IMPAIRED:27188}  Fifth Digit (C8) L: {INTACT/IMPAIRED:98849}; R: {INTACT/IMPAIRED:98523}  Medial Upper Arm (T1) L: {INTACT/IMPAIRED:46801}; R: {INTACT/IMPAIRED:37968}     Myotomes/Strength (MMT in sitting):  Shoulder Elevation (C4): L {MMT 0-5:79104::\"5\"}, R {MMT 0-5:84511::\"5\"}  Shoulder Abduction (C5): L {MMT 0-5:72296::\"5\"}, R {MMT 0-5:32223::\"5\"}  Shoulder Flexion: L {MMT 0-5:17728::\"5\"}, R {MMT 0-5:60614::\"5\"}  Shoulder Extension: L {MMT 0-5:05996::\"5\"}, R {MMT 0-5:46653::\"5\"}  Shoulder IR: L {MMT 0-5:17776::\"5\"}, R {MMT 0-5:89001::\"5\"}  Shoulder ER: L {MMT " "0-5:72982::\"5\"}, R {MMT 0-5:74993::\"5\"}  Elbow Flexion/Wrist Ext (C6): L {MMT 0-5:88773::\"5\"}, R {MMT 0-5:80954::\"5\"}  Elbow Extension/Wrist Flexion (C7): L {MMT 0-5:67954::\"5\"}, R {MMT 0-5:51989::\"5\"}  Thumb Ext/Ulnar Deviation (C8): L {MMT 0-5:93645::\"5\"}, R {MMT 0-5:02792::\"5\"}  Hand Intrinsics- abd/add (T1): L {MMT 0-5:56899::\"5\"}, R {MMT 0-5:39346::\"5\"}    Rhomboid Strength: L {MMT 0-5:40155::\"5\"}, R {MMT 0-5:20362::\"5\"}  Middle Trapezius Strength: L {MMT 0-5:44724::\"5\"}, R {MMT 0-5:20649::\"5\"}  Lower Trapezius Strength: L {MMT 0-5:19111::\"5\"}, R {MMT 0-5:01720::\"5\"}    Cervical AROM (degrees):  Flexion: ***  Extension: ***  Side-bending L/R: *** / ***  Rotation L/R: *** / ***    Repeated Movements:  Flexion 10x: ***  Extension 10x: ***  Side-bending L 10x: ***  Side-bending R 10x: ***  Rotation L 10x: ***  Rotation R 10x: ***    Shoulder AROM:  Shoulder Flexion L/R: ***  Shoulder Abduction L/R: ***  Shoulder Extension L/R: ***  Shoulder ER L/R: ***  Shoulder IR L/R: ***      Special tests:   Spurling's: L {POSITIVE/NEGATIVE:06979}; R {POSITIVE/NEGATIVE:06935}  Distraction: {POSITIVE/NEGATIVE:16455}  Cervical Flexion Rotation Test: {POSITIVE/NEGATIVE:63332}  Neck Flexor Muscle Endurance Test: {POSITIVE/NEGATIVE:17191}  ULTT:  Median: L {POSITIVE/NEGATIVE:31257}; R {POSITIVE/NEGATIVE:45676}  Ulnar: L {POSITIVE/NEGATIVE:56361}; R {POSITIVE/NEGATIVE:04122}  Radial: L {POSITIVE/NEGATIVE:30882}; R {POSITIVE/NEGATIVE:30917}    Palpation:  Segmental Mobility Testing: ***  Muscle Tension: ***    Treatments:    Assigned HEP- [employer] ***    {talbillingoptions:79971}    EDUCATION:       -Educated Ana  on the role of PT and PT POC  -Educated Ana regarding benefit and purpose of skilled PT services along with results of examination findings and how this correlates to their chief complaint.   -Answered Ana's questions in full.  -Educated Ana on relevant anatomy and the rationale for exercises  -Ana A Devin " advised to hold any ex if it increases pain, Ana Beltran verbalized understanding    Goals:        STG's (within 2 weeks)  Ana Beltran will decrease pain by >/= 2/10 points from baseline to improve ability to perform household/recreational activities.    Ana Beltran will be able to sleep through the night with less than 2 interruptions due to pain in order to improve mental and physical well-being in order to safely participate in ADLs.     Ana Beltran will demonstrate independence,  excellent understanding of and report compliance with at least 3 exercises in her HEP to facilitate the learning process to maximize PT benefits and to facilitate independent rehab program upon discharge.    LTG's (by discharge)    Ana Beltran   will report 50% improvement in sensation to indicate healing and to improve household/recreational activities.    Ana Crenshawer   will report 50% reduction in tenderness to palpation to indicate healing and to improve household/recreational activities.    Ana Beltran  will report one full day of work (full duty) without pain to indicate ability to return to work and ADLs without limitation.    Ana Crenshawer   will report driving without pain to allow for return to work and ADLs without limitation.     Ana Beltran   will report 30% improvement in sleep status in order to attain adequate rest.     Ana Beltran   will report ability to sleep with </= 2 interruptions per week in order to enhance ability to get a good night's sleep.     Ana Crenshawer   will demonstrate increased functional deep cervical neck flexor strength by sustaining good body mechanics without significant compensation from superficial musculature during simulation of mopping, vacuuming, and lifting objects of the floor in order to enhance functional mobility/ decrease strain on spine with work related activities and housework.    Ana Crenshawer  will increase Neck Disability  Index (NDI) score by >/=7 points or 14% (minimal clinically important difference) in order to reflect improvement in ADL's/pain reduction and improve QOL. Baseline [mm/dd/yyyy]: /50, %    Ana Makgler  will demonstrate increase in deep cervical neck flexor strength by sustaining musculature in supine position without pain for >/= 10 seconds without significant compensation from superficial musculature in order to enhance ability to complete ADLs, IADLs, and recreational/ occupational activities.    Ana Makgler  will report 30% reduction in pain during ADLs to improve tolerance to household activities.    Ana A Devin  will increase cervical mobility to WNL/symmetrical without pain for unlimited ability to perform home household/occupational/recreational tasks.    Ana Makgler  will demonstrate independence and report compliance with HEP to facilitate independent rehab program upon discharge for long-term maintenance of condition and gains from PT POC.    Ana Makgler  will be able to sit for [amount of time] to allow completion of a full day of work with minimal to no pain, compensations, or modifications     Ana Makgler   will be able to lift, carry, and handle objects of > or = [] lbs in order to participate in []    Ana Makgler   will demonstrate bilateral symmetrical and pain-free ROM in all planes fo cervical spine motion to allow for pt to achieve his / her goals of [] with minimal to no compensations or modifications    Ana CARDENAS Devin  will demonstrate good posture as noted by requiring < or = 2 postural cues within session, showing knowledge of appropriate spinal alignment to decrease excess strain through the cervical spine to allow for increased ability to perform ADLS (including lifting objects and reaching to high shelves)    Ana Makgler will decrease pain to 0-2/10 to improve ability to perform household/recreational activities.    Ana Makgler will be able to  sleep through the night without waking due to pain in order to improve mental and physical well-being in order to safely participate in ADLs.     Ana Crenshawer will demonstrate independence,  excellent understanding of and report compliance with HEP to facilitate the learning process to maximize PT benefits and to facilitate independent rehab program upon discharge.

## 2025-06-30 NOTE — PROGRESS NOTES
Physical Therapy                      Therapy Communication Note    Patient Name: Ana Beltran  MRN: [id number]  Today's Date: 6/30/2025     Discipline: Physical Therapy    Missed Time: Cancel    Comment: Canceled today's PT evaluation via [facility]